# Patient Record
Sex: FEMALE | Race: WHITE | NOT HISPANIC OR LATINO | Employment: PART TIME | RURAL
[De-identification: names, ages, dates, MRNs, and addresses within clinical notes are randomized per-mention and may not be internally consistent; named-entity substitution may affect disease eponyms.]

---

## 2022-02-02 ENCOUNTER — OFFICE VISIT (OUTPATIENT)
Dept: OTOLARYNGOLOGY | Facility: CLINIC | Age: 37
End: 2022-02-02
Payer: COMMERCIAL

## 2022-02-02 VITALS — BODY MASS INDEX: 31.83 KG/M2 | WEIGHT: 210 LBS | HEIGHT: 68 IN

## 2022-02-02 DIAGNOSIS — H92.03 OTALGIA OF BOTH EARS: Primary | ICD-10-CM

## 2022-02-02 DIAGNOSIS — J31.0 OTHER RHINITIS: ICD-10-CM

## 2022-02-02 DIAGNOSIS — H60.313 DIFFUSE OTITIS EXTERNA OF BOTH EARS, UNSPECIFIED CHRONICITY: ICD-10-CM

## 2022-02-02 PROCEDURE — 3008F PR BODY MASS INDEX (BMI) DOCUMENTED: ICD-10-PCS | Mod: CPTII,,, | Performed by: OTOLARYNGOLOGY

## 2022-02-02 PROCEDURE — 99204 OFFICE O/P NEW MOD 45 MIN: CPT | Mod: S$PBB,,, | Performed by: OTOLARYNGOLOGY

## 2022-02-02 PROCEDURE — 1160F RVW MEDS BY RX/DR IN RCRD: CPT | Mod: CPTII,,, | Performed by: OTOLARYNGOLOGY

## 2022-02-02 PROCEDURE — 3008F BODY MASS INDEX DOCD: CPT | Mod: CPTII,,, | Performed by: OTOLARYNGOLOGY

## 2022-02-02 PROCEDURE — 1159F MED LIST DOCD IN RCRD: CPT | Mod: CPTII,,, | Performed by: OTOLARYNGOLOGY

## 2022-02-02 PROCEDURE — 1159F PR MEDICATION LIST DOCUMENTED IN MEDICAL RECORD: ICD-10-PCS | Mod: CPTII,,, | Performed by: OTOLARYNGOLOGY

## 2022-02-02 PROCEDURE — 99204 PR OFFICE/OUTPT VISIT, NEW, LEVL IV, 45-59 MIN: ICD-10-PCS | Mod: S$PBB,,, | Performed by: OTOLARYNGOLOGY

## 2022-02-02 PROCEDURE — 1160F PR REVIEW ALL MEDS BY PRESCRIBER/CLIN PHARMACIST DOCUMENTED: ICD-10-PCS | Mod: CPTII,,, | Performed by: OTOLARYNGOLOGY

## 2022-02-02 PROCEDURE — 99203 OFFICE O/P NEW LOW 30 MIN: CPT | Mod: PBBFAC | Performed by: OTOLARYNGOLOGY

## 2022-02-02 RX ORDER — NEOMYCIN SULFATE, POLYMYXIN B SULFATE AND HYDROCORTISONE 10; 3.5; 1 MG/ML; MG/ML; [USP'U]/ML
3 SUSPENSION/ DROPS AURICULAR (OTIC) 3 TIMES DAILY
Qty: 10 ML | Refills: 0 | Status: SHIPPED | OUTPATIENT
Start: 2022-02-02

## 2022-02-02 NOTE — PROGRESS NOTES
Subjective:       Patient ID: Theodora Murray is a 36 y.o. female.    Chief Complaint: Otalgia (Pt states both ears hurt, denies drainage. ) and Sinusitis (States she has had some sinus drainage.)    HPI  Review of Systems   HENT: Positive for ear pain, postnasal drip, rhinorrhea and sinus pain.    All other systems reviewed and are negative.      Objective:      Physical Exam  General: NAD  Head: Normocephalic, atraumatic, no facial asymmetry/normal strength,  Ears: Both auricules normal in appearance, w/o deformities tympanic membranes normal external auditory canals EAC's irritated   Nose: External nose w/o deformities normal turbinates no drainage or inflammation  Oral Cavity: Lips, gums, floor of mouth, tongue hard palate, and buccal mucosa without mass/lesion  Oropharynx: Mucosa pink and moist, soft palate, posterior pharynx and oropharyngeal wall without mass/lesion  Neck: Supple, symmetric, trachea midline, no palpable mass/lesion, no palpable cervical lymphadenopathy  Skin: Warm and dry, no concerning lesions  Respiratory: Respirations even, unlabored    Assessment:       1. Otalgia of both ears    2. Diffuse otitis externa of both ears, unspecified chronicity    3. Other rhinitis        Plan:       Csp drops   F/u 1 week      Trial zyrtec for nose

## 2022-09-22 ENCOUNTER — TELEPHONE (OUTPATIENT)
Dept: OBSTETRICS AND GYNECOLOGY | Facility: CLINIC | Age: 37
End: 2022-09-22
Payer: COMMERCIAL

## 2022-09-22 ENCOUNTER — OFFICE VISIT (OUTPATIENT)
Dept: OBSTETRICS AND GYNECOLOGY | Facility: CLINIC | Age: 37
End: 2022-09-22
Payer: COMMERCIAL

## 2022-09-22 VITALS
SYSTOLIC BLOOD PRESSURE: 146 MMHG | WEIGHT: 229 LBS | RESPIRATION RATE: 16 BRPM | HEART RATE: 89 BPM | DIASTOLIC BLOOD PRESSURE: 98 MMHG | TEMPERATURE: 98 F | BODY MASS INDEX: 34.71 KG/M2 | HEIGHT: 68 IN

## 2022-09-22 DIAGNOSIS — E55.9 VITAMIN D DEFICIENCY: ICD-10-CM

## 2022-09-22 DIAGNOSIS — N93.8 DUB (DYSFUNCTIONAL UTERINE BLEEDING): Primary | ICD-10-CM

## 2022-09-22 DIAGNOSIS — K64.9 HEMORRHOIDS, UNSPECIFIED HEMORRHOID TYPE: ICD-10-CM

## 2022-09-22 DIAGNOSIS — E87.6 HYPOKALEMIA: ICD-10-CM

## 2022-09-22 LAB
BASOPHILS # BLD AUTO: 0.06 K/UL (ref 0–0.2)
BASOPHILS NFR BLD AUTO: 0.5 % (ref 0–1)
BILIRUB UR QL STRIP: NEGATIVE
CLARITY UR: CLEAR
COLOR UR: ABNORMAL
DIFFERENTIAL METHOD BLD: ABNORMAL
EOSINOPHIL # BLD AUTO: 0.09 K/UL (ref 0–0.5)
EOSINOPHIL NFR BLD AUTO: 0.8 % (ref 1–4)
ERYTHROCYTE [DISTWIDTH] IN BLOOD BY AUTOMATED COUNT: 13.9 % (ref 11.5–14.5)
GLUCOSE UR STRIP-MCNC: NORMAL MG/DL
HCT VFR BLD AUTO: 40.7 % (ref 38–47)
HGB BLD-MCNC: 12.7 G/DL (ref 12–16)
IMM GRANULOCYTES # BLD AUTO: 0.12 K/UL (ref 0–0.04)
IMM GRANULOCYTES NFR BLD: 1.1 % (ref 0–0.4)
KETONES UR STRIP-SCNC: NEGATIVE MG/DL
LEUKOCYTE ESTERASE UR QL STRIP: NEGATIVE
LYMPHOCYTES # BLD AUTO: 1.9 K/UL (ref 1–4.8)
LYMPHOCYTES NFR BLD AUTO: 17.4 % (ref 27–41)
MCH RBC QN AUTO: 28.2 PG (ref 27–31)
MCHC RBC AUTO-ENTMCNC: 31.2 G/DL (ref 32–36)
MCV RBC AUTO: 90.4 FL (ref 80–96)
MONOCYTES # BLD AUTO: 0.74 K/UL (ref 0–0.8)
MONOCYTES NFR BLD AUTO: 6.8 % (ref 2–6)
MPC BLD CALC-MCNC: 10.5 FL (ref 9.4–12.4)
NEUTROPHILS # BLD AUTO: 8.01 K/UL (ref 1.8–7.7)
NEUTROPHILS NFR BLD AUTO: 73.4 % (ref 53–65)
NITRITE UR QL STRIP: NEGATIVE
NRBC # BLD AUTO: 0 X10E3/UL
NRBC, AUTO (.00): 0 %
PH UR STRIP: 7 PH UNITS
PLATELET # BLD AUTO: 426 K/UL (ref 150–400)
PROT UR QL STRIP: NEGATIVE
RBC # BLD AUTO: 4.5 M/UL (ref 4.2–5.4)
RBC # UR STRIP: ABNORMAL /UL
SP GR UR STRIP: 1.02
UROBILINOGEN UR STRIP-ACNC: NORMAL MG/DL
WBC # BLD AUTO: 10.92 K/UL (ref 4.5–11)

## 2022-09-22 PROCEDURE — 87660 TRICHOMONAS VAGIN DIR PROBE: CPT | Mod: ,,, | Performed by: CLINICAL MEDICAL LABORATORY

## 2022-09-22 PROCEDURE — 87510 GARDNER VAG DNA DIR PROBE: CPT | Mod: ,,, | Performed by: CLINICAL MEDICAL LABORATORY

## 2022-09-22 PROCEDURE — 80050 PR  GENERAL HEALTH PANEL: ICD-10-PCS | Mod: ,,, | Performed by: CLINICAL MEDICAL LABORATORY

## 2022-09-22 PROCEDURE — 81001 URINALYSIS, REFLEX TO URINE CULTURE: ICD-10-PCS | Mod: ,,, | Performed by: CLINICAL MEDICAL LABORATORY

## 2022-09-22 PROCEDURE — 87480 CANDIDA DNA DIR PROBE: CPT | Mod: ,,, | Performed by: CLINICAL MEDICAL LABORATORY

## 2022-09-22 PROCEDURE — 36415 COLL VENOUS BLD VENIPUNCTURE: CPT | Mod: ,,, | Performed by: OBSTETRICS & GYNECOLOGY

## 2022-09-22 PROCEDURE — 80050 GENERAL HEALTH PANEL: CPT | Mod: ,,, | Performed by: CLINICAL MEDICAL LABORATORY

## 2022-09-22 PROCEDURE — 87660 BACTERIAL VAGINOSIS: ICD-10-PCS | Mod: ,,, | Performed by: CLINICAL MEDICAL LABORATORY

## 2022-09-22 PROCEDURE — 88142 CYTOPATH C/V THIN LAYER: CPT | Mod: GCY | Performed by: OBSTETRICS & GYNECOLOGY

## 2022-09-22 PROCEDURE — 3077F PR MOST RECENT SYSTOLIC BLOOD PRESSURE >= 140 MM HG: ICD-10-PCS | Mod: CPTII,,, | Performed by: OBSTETRICS & GYNECOLOGY

## 2022-09-22 PROCEDURE — 36415 PR COLLECTION VENOUS BLOOD,VENIPUNCTURE: ICD-10-PCS | Mod: ,,, | Performed by: OBSTETRICS & GYNECOLOGY

## 2022-09-22 PROCEDURE — 99205 PR OFFICE/OUTPT VISIT, NEW, LEVL V, 60-74 MIN: ICD-10-PCS | Mod: ,,, | Performed by: OBSTETRICS & GYNECOLOGY

## 2022-09-22 PROCEDURE — 84439 THYROID PANEL: ICD-10-PCS | Mod: GZ,,, | Performed by: CLINICAL MEDICAL LABORATORY

## 2022-09-22 PROCEDURE — 84439 ASSAY OF FREE THYROXINE: CPT | Mod: GZ,,, | Performed by: CLINICAL MEDICAL LABORATORY

## 2022-09-22 PROCEDURE — 3077F SYST BP >= 140 MM HG: CPT | Mod: CPTII,,, | Performed by: OBSTETRICS & GYNECOLOGY

## 2022-09-22 PROCEDURE — 87480 BACTERIAL VAGINOSIS: ICD-10-PCS | Mod: ,,, | Performed by: CLINICAL MEDICAL LABORATORY

## 2022-09-22 PROCEDURE — 81001 URINALYSIS AUTO W/SCOPE: CPT | Mod: ,,, | Performed by: CLINICAL MEDICAL LABORATORY

## 2022-09-22 PROCEDURE — 87510 BACTERIAL VAGINOSIS: ICD-10-PCS | Mod: ,,, | Performed by: CLINICAL MEDICAL LABORATORY

## 2022-09-22 PROCEDURE — 82306 VITAMIN D 25 HYDROXY: CPT | Mod: ,,, | Performed by: CLINICAL MEDICAL LABORATORY

## 2022-09-22 PROCEDURE — 3008F BODY MASS INDEX DOCD: CPT | Mod: CPTII,,, | Performed by: OBSTETRICS & GYNECOLOGY

## 2022-09-22 PROCEDURE — 3080F DIAST BP >= 90 MM HG: CPT | Mod: CPTII,,, | Performed by: OBSTETRICS & GYNECOLOGY

## 2022-09-22 PROCEDURE — 3080F PR MOST RECENT DIASTOLIC BLOOD PRESSURE >= 90 MM HG: ICD-10-PCS | Mod: CPTII,,, | Performed by: OBSTETRICS & GYNECOLOGY

## 2022-09-22 PROCEDURE — 99205 OFFICE O/P NEW HI 60 MIN: CPT | Mod: ,,, | Performed by: OBSTETRICS & GYNECOLOGY

## 2022-09-22 PROCEDURE — 3008F PR BODY MASS INDEX (BMI) DOCUMENTED: ICD-10-PCS | Mod: CPTII,,, | Performed by: OBSTETRICS & GYNECOLOGY

## 2022-09-22 PROCEDURE — 82306 VITAMIN D: ICD-10-PCS | Mod: ,,, | Performed by: CLINICAL MEDICAL LABORATORY

## 2022-09-22 RX ORDER — BUSPIRONE HYDROCHLORIDE 10 MG/1
10 TABLET ORAL 2 TIMES DAILY
COMMUNITY
Start: 2022-09-06

## 2022-09-22 RX ORDER — ASPIRIN 325 MG
50000 TABLET, DELAYED RELEASE (ENTERIC COATED) ORAL
COMMUNITY
Start: 2022-09-09

## 2022-09-22 RX ORDER — POTASSIUM CHLORIDE 20 MEQ/1
20 TABLET, EXTENDED RELEASE ORAL 3 TIMES DAILY
COMMUNITY
Start: 2022-09-09

## 2022-09-22 RX ORDER — ATENOLOL 100 MG/1
100 TABLET ORAL DAILY
COMMUNITY
Start: 2022-09-08

## 2022-09-22 RX ORDER — CETIRIZINE HYDROCHLORIDE 10 MG/1
10 TABLET ORAL NIGHTLY
COMMUNITY
Start: 2022-09-08

## 2022-09-22 RX ORDER — OMEPRAZOLE 40 MG/1
40 CAPSULE, DELAYED RELEASE ORAL DAILY
COMMUNITY
Start: 2022-09-08

## 2022-09-22 RX ORDER — DULOXETIN HYDROCHLORIDE 30 MG/1
30 CAPSULE, DELAYED RELEASE ORAL DAILY
COMMUNITY
Start: 2022-07-25

## 2022-09-22 RX ORDER — METRONIDAZOLE 500 MG/1
500 TABLET ORAL 2 TIMES DAILY
Status: ON HOLD | COMMUNITY
Start: 2022-09-08 | End: 2022-11-16

## 2022-09-22 RX ORDER — CHLORTHALIDONE 25 MG/1
25 TABLET ORAL DAILY
COMMUNITY
Start: 2022-09-08

## 2022-09-23 LAB
25(OH)D3 SERPL-MCNC: 35.6 NG/ML
ALBUMIN SERPL BCP-MCNC: 4 G/DL (ref 3.5–5)
ALBUMIN/GLOB SERPL: 1.1 {RATIO}
ALP SERPL-CCNC: 88 U/L (ref 37–98)
ALT SERPL W P-5'-P-CCNC: 34 U/L (ref 13–56)
ANION GAP SERPL CALCULATED.3IONS-SCNC: 12 MMOL/L (ref 7–16)
AST SERPL W P-5'-P-CCNC: 23 U/L (ref 15–37)
BACTERIA #/AREA URNS HPF: ABNORMAL /HPF
BILIRUB SERPL-MCNC: 0.3 MG/DL (ref ?–1.2)
BUN SERPL-MCNC: 15 MG/DL (ref 7–18)
BUN/CREAT SERPL: 22 (ref 6–20)
CALCIUM SERPL-MCNC: 9.3 MG/DL (ref 8.5–10.1)
CANDIDA SPECIES: NEGATIVE
CHLORIDE SERPL-SCNC: 99 MMOL/L (ref 98–107)
CO2 SERPL-SCNC: 29 MMOL/L (ref 21–32)
CREAT SERPL-MCNC: 0.68 MG/DL (ref 0.55–1.02)
EGFR (NO RACE VARIABLE) (RUSH/TITUS): 116 ML/MIN/1.73M²
GARDNERELLA: NEGATIVE
GLOBULIN SER-MCNC: 3.6 G/DL (ref 2–4)
GLUCOSE SERPL-MCNC: 77 MG/DL (ref 74–106)
MUCOUS, UA: ABNORMAL /LPF
POTASSIUM SERPL-SCNC: 3 MMOL/L (ref 3.5–5.1)
PROT SERPL-MCNC: 7.6 G/DL (ref 6.4–8.2)
RBC #/AREA URNS HPF: 13 /HPF
SODIUM SERPL-SCNC: 137 MMOL/L (ref 136–145)
SQUAMOUS #/AREA URNS LPF: ABNORMAL /HPF
T4 FREE SERPL-MCNC: 1.08 NG/DL (ref 0.76–1.46)
TRICHOMONAS: NEGATIVE
TSH SERPL DL<=0.005 MIU/L-ACNC: 0.93 UIU/ML (ref 0.36–3.74)
WBC #/AREA URNS HPF: <1 /HPF

## 2022-09-23 NOTE — PROGRESS NOTES
Epic system down. See  for office visit note.    Per Dr. Chery schedule pelvic ultrasound and schedule for D&C hysteroscopy

## 2022-09-26 LAB
GH SERPL-MCNC: NORMAL NG/ML
INSULIN SERPL-ACNC: NORMAL U[IU]/ML
LAB AP CLINICAL INFORMATION: NORMAL
LAB AP GYN INTERPRETATION: NEGATIVE
LAB AP PAP DISCLAIMER COMMENTS: NORMAL
RENIN PLAS-CCNC: NORMAL NG/ML/H

## 2022-09-27 ENCOUNTER — TELEPHONE (OUTPATIENT)
Dept: OBSTETRICS AND GYNECOLOGY | Facility: CLINIC | Age: 37
End: 2022-09-27
Payer: COMMERCIAL

## 2022-09-28 ENCOUNTER — TELEPHONE (OUTPATIENT)
Dept: OBSTETRICS AND GYNECOLOGY | Facility: CLINIC | Age: 37
End: 2022-09-28
Payer: COMMERCIAL

## 2022-10-05 ENCOUNTER — HOSPITAL ENCOUNTER (OUTPATIENT)
Dept: RADIOLOGY | Facility: HOSPITAL | Age: 37
Discharge: HOME | End: 2022-10-05
Attending: OBSTETRICS & GYNECOLOGY
Payer: COMMERCIAL

## 2022-10-05 DIAGNOSIS — N93.8 DUB (DYSFUNCTIONAL UTERINE BLEEDING): ICD-10-CM

## 2022-10-05 PROCEDURE — 76830 US PELVIS COMP WITH TRANSVAG NON-OB (XPD): ICD-10-PCS | Mod: 26,,, | Performed by: STUDENT IN AN ORGANIZED HEALTH CARE EDUCATION/TRAINING PROGRAM

## 2022-10-05 PROCEDURE — 76830 TRANSVAGINAL US NON-OB: CPT | Mod: 26,,, | Performed by: STUDENT IN AN ORGANIZED HEALTH CARE EDUCATION/TRAINING PROGRAM

## 2022-10-05 PROCEDURE — 76856 US PELVIS COMP WITH TRANSVAG NON-OB (XPD): ICD-10-PCS | Mod: 26,,, | Performed by: STUDENT IN AN ORGANIZED HEALTH CARE EDUCATION/TRAINING PROGRAM

## 2022-10-05 PROCEDURE — 76830 TRANSVAGINAL US NON-OB: CPT | Mod: TC

## 2022-10-05 PROCEDURE — 76856 US EXAM PELVIC COMPLETE: CPT | Mod: 26,,, | Performed by: STUDENT IN AN ORGANIZED HEALTH CARE EDUCATION/TRAINING PROGRAM

## 2022-10-13 ENCOUNTER — OFFICE VISIT (OUTPATIENT)
Dept: OBSTETRICS AND GYNECOLOGY | Facility: CLINIC | Age: 37
End: 2022-10-13
Payer: COMMERCIAL

## 2022-10-13 VITALS
BODY MASS INDEX: 33.14 KG/M2 | HEART RATE: 83 BPM | WEIGHT: 218.63 LBS | DIASTOLIC BLOOD PRESSURE: 61 MMHG | HEIGHT: 68 IN | RESPIRATION RATE: 16 BRPM | SYSTOLIC BLOOD PRESSURE: 109 MMHG | TEMPERATURE: 97 F

## 2022-10-13 DIAGNOSIS — N32.81 OAB (OVERACTIVE BLADDER): ICD-10-CM

## 2022-10-13 DIAGNOSIS — N94.6 DYSMENORRHEA: ICD-10-CM

## 2022-10-13 DIAGNOSIS — N94.19 DYSPAREUNIA DUE TO MEDICAL CONDITION IN FEMALE: ICD-10-CM

## 2022-10-13 DIAGNOSIS — N93.9 ABNORMAL UTERINE BLEEDING (AUB): Primary | ICD-10-CM

## 2022-10-13 DIAGNOSIS — R35.1 NOCTURIA MORE THAN TWICE PER NIGHT: ICD-10-CM

## 2022-10-13 LAB
BASOPHILS # BLD AUTO: 0.05 K/UL (ref 0–0.2)
BASOPHILS NFR BLD AUTO: 0.5 % (ref 0–1)
BILIRUB UR QL STRIP: NEGATIVE
CANCER AG125 SERPL-ACNC: 12 U/ML (ref 0–35)
CLARITY UR: ABNORMAL
COLOR UR: YELLOW
DIFFERENTIAL METHOD BLD: ABNORMAL
EOSINOPHIL # BLD AUTO: 0.07 K/UL (ref 0–0.5)
EOSINOPHIL NFR BLD AUTO: 0.7 % (ref 1–4)
ERYTHROCYTE [DISTWIDTH] IN BLOOD BY AUTOMATED COUNT: 14.2 % (ref 11.5–14.5)
ERYTHROCYTE [SEDIMENTATION RATE] IN BLOOD BY WESTERGREN METHOD: 7 MM/HR (ref 0–20)
GLUCOSE UR STRIP-MCNC: NORMAL MG/DL
HCT VFR BLD AUTO: 44.7 % (ref 38–47)
HGB BLD-MCNC: 14.3 G/DL (ref 12–16)
IMM GRANULOCYTES # BLD AUTO: 0.07 K/UL (ref 0–0.04)
IMM GRANULOCYTES NFR BLD: 0.7 % (ref 0–0.4)
KETONES UR STRIP-SCNC: NEGATIVE MG/DL
LEUKOCYTE ESTERASE UR QL STRIP: NEGATIVE
LYMPHOCYTES # BLD AUTO: 2 K/UL (ref 1–4.8)
LYMPHOCYTES NFR BLD AUTO: 18.6 % (ref 27–41)
MCH RBC QN AUTO: 28.3 PG (ref 27–31)
MCHC RBC AUTO-ENTMCNC: 32 G/DL (ref 32–36)
MCV RBC AUTO: 88.5 FL (ref 80–96)
MONOCYTES # BLD AUTO: 0.84 K/UL (ref 0–0.8)
MONOCYTES NFR BLD AUTO: 7.8 % (ref 2–6)
MPC BLD CALC-MCNC: 10.3 FL (ref 9.4–12.4)
NEUTROPHILS # BLD AUTO: 7.7 K/UL (ref 1.8–7.7)
NEUTROPHILS NFR BLD AUTO: 71.7 % (ref 53–65)
NITRITE UR QL STRIP: NEGATIVE
NRBC # BLD AUTO: 0 X10E3/UL
NRBC, AUTO (.00): 0 %
PH UR STRIP: 7 PH UNITS
PLATELET # BLD AUTO: 479 K/UL (ref 150–400)
PROT UR QL STRIP: 20
RBC # BLD AUTO: 5.05 M/UL (ref 4.2–5.4)
RBC # UR STRIP: NEGATIVE /UL
SP GR UR STRIP: 1.02
UROBILINOGEN UR STRIP-ACNC: NORMAL MG/DL
WBC # BLD AUTO: 10.73 K/UL (ref 4.5–11)

## 2022-10-13 PROCEDURE — 85651 SEDIMENTATION RATE, AUTOMATED: ICD-10-PCS | Mod: ,,, | Performed by: CLINICAL MEDICAL LABORATORY

## 2022-10-13 PROCEDURE — 3078F DIAST BP <80 MM HG: CPT | Mod: CPTII,,, | Performed by: OBSTETRICS & GYNECOLOGY

## 2022-10-13 PROCEDURE — 1159F MED LIST DOCD IN RCRD: CPT | Mod: CPTII,,, | Performed by: OBSTETRICS & GYNECOLOGY

## 2022-10-13 PROCEDURE — 81003 URINALYSIS, REFLEX TO URINE CULTURE: ICD-10-PCS | Mod: QW,,, | Performed by: CLINICAL MEDICAL LABORATORY

## 2022-10-13 PROCEDURE — 36415 COLL VENOUS BLD VENIPUNCTURE: CPT | Mod: ,,, | Performed by: OBSTETRICS & GYNECOLOGY

## 2022-10-13 PROCEDURE — 3078F PR MOST RECENT DIASTOLIC BLOOD PRESSURE < 80 MM HG: ICD-10-PCS | Mod: CPTII,,, | Performed by: OBSTETRICS & GYNECOLOGY

## 2022-10-13 PROCEDURE — 81003 URINALYSIS AUTO W/O SCOPE: CPT | Mod: QW,,, | Performed by: CLINICAL MEDICAL LABORATORY

## 2022-10-13 PROCEDURE — 36415 PR COLLECTION VENOUS BLOOD,VENIPUNCTURE: ICD-10-PCS | Mod: ,,, | Performed by: OBSTETRICS & GYNECOLOGY

## 2022-10-13 PROCEDURE — 1159F PR MEDICATION LIST DOCUMENTED IN MEDICAL RECORD: ICD-10-PCS | Mod: CPTII,,, | Performed by: OBSTETRICS & GYNECOLOGY

## 2022-10-13 PROCEDURE — 85025 COMPLETE CBC W/AUTO DIFF WBC: CPT | Mod: ,,, | Performed by: CLINICAL MEDICAL LABORATORY

## 2022-10-13 PROCEDURE — 85651 RBC SED RATE NONAUTOMATED: CPT | Mod: ,,, | Performed by: CLINICAL MEDICAL LABORATORY

## 2022-10-13 PROCEDURE — 86304 IMMUNOASSAY TUMOR CA 125: CPT | Mod: ,,, | Performed by: CLINICAL MEDICAL LABORATORY

## 2022-10-13 PROCEDURE — 99214 PR OFFICE/OUTPT VISIT, EST, LEVL IV, 30-39 MIN: ICD-10-PCS | Mod: ,,, | Performed by: OBSTETRICS & GYNECOLOGY

## 2022-10-13 PROCEDURE — 86304 CANCER ANTIGEN 125: ICD-10-PCS | Mod: ,,, | Performed by: CLINICAL MEDICAL LABORATORY

## 2022-10-13 PROCEDURE — 99214 OFFICE O/P EST MOD 30 MIN: CPT | Mod: ,,, | Performed by: OBSTETRICS & GYNECOLOGY

## 2022-10-13 PROCEDURE — 85025 CBC WITH DIFFERENTIAL: ICD-10-PCS | Mod: ,,, | Performed by: CLINICAL MEDICAL LABORATORY

## 2022-10-13 PROCEDURE — 3074F SYST BP LT 130 MM HG: CPT | Mod: CPTII,,, | Performed by: OBSTETRICS & GYNECOLOGY

## 2022-10-13 PROCEDURE — 3074F PR MOST RECENT SYSTOLIC BLOOD PRESSURE < 130 MM HG: ICD-10-PCS | Mod: CPTII,,, | Performed by: OBSTETRICS & GYNECOLOGY

## 2022-10-13 NOTE — PROGRESS NOTES
Theodora Arndt female  for   Chief Complaint   Patient presents with    Follow-up     Labs and ultrasound results          PHI:  Follow-up on Theodora arndt is 37 years old with  0, para 0 condition.  Patient relates she has had for almost now 4 months abnormal uterine bleeding.  Most recent was 4 days ago.  Associated is crampy pelvic pain.  Patient's last Depo-Provera for contraception was approximately 6 months ago.  All symptoms of her abnormal uterine bleeding are new.    Her pelvic ultrasound:  Uterus measures 5.5 x 3.8 x 2.5 cm.  Endometrial stripe is normal in size measuring 0.4 cm.  Right ovary is poorly visualized.     The left ovary measures 3 x 1.4 x 2.0 cm.  Normal flow to the left ovary.     [place] hypoechogenic structure with possible Doppler flow arising from the cervix measuring 1.5 x 0.8 cm. This could represent complex nabothian cyst or possibly clotted blood with surrounding fluid. This is a nonspecific finding at this current time.  Direct visualization is recommended. Furthermore MRI of the pelvis without and with intravenous contrast should be considered.     Impression:     [place] hypoechogenic structure with possible Doppler flow arising from the cervix measuring 1.5 x 0.8 cm. This could represent complex nabothian cyst or possibly clotted blood with surrounding fluid. This is a nonspecific finding at this current time.  Direct visualization is recommended. Furthermore MRI of the pelvis without and with intravenous contrast should be considered.     Right ovary is poorly visualized.  Exam is otherwise normal.        Electronically signed by: Sagar Parrish  Date:                                            10/05/2022  Time:                                           16:04           Exam Ended: 10/05/22 14:30 Last Resulted: 10/05/22 16:04         Patient also relates addition worsening of dysmenorrhea or crampy pain with abnormal bleeding she has worsening symptoms of generalized pelvic  "pain.  Patient currently is not sexually active.  Patient when she was sexually active did complain of dyspareunia.  She does complain urgency and frequency since the onset of her symptoms 3-4 months ago.  Patient relates the general pelvic pain is been present for several years.    She relates in addition urgency and frequency she complains of nocturia-several times per night-greater than 2 times per night.  These are chronic problems.  Her last urinalysis did reveal some microscopic hematuria.    Past Medical History:   Diagnosis Date    Chronic GERD     Depression     Generalized anxiety disorder     Hypertension     Hypokalemia     Unspecified hemorrhoids       History reviewed. No pertinent surgical history.   Review of patient's allergies indicates:  No Known Allergies     ROS:Pertinent items are noted in HPI.    Physical exam:    /61   Pulse 83   Temp 96.7 °F (35.9 °C)   Resp 16   Ht 5' 8" (1.727 m)   Wt 99.2 kg (218 lb 9.6 oz)   LMP 10/06/2022   BMI 33.24 kg/m²      General Appearance: healthy, alert, no distress, smiling, BMI is 33.24    Chest:           Lungs: clear to auscultation bilaterally and normal percussion bilaterally           Heart: regular rate and rhythm, S1, S2 normal, no murmur, click, rub or gallop, normal apical impulse, and regular rate and rhythm    Abdomen:not performed    Pelvic: not performed     Extremity: normal, extremities warm, no clubbing, no cyanosis, no edema, non-tender    Skin: normal exam        Assessment:   Problem List Items Addressed This Visit    None  Visit Diagnoses       Abnormal uterine bleeding (AUB)    -  Primary    Dysmenorrhea        Dyspareunia due to medical condition in female        Nocturia more than twice per night        OAB (overactive bladder)                 Plan:  Recommendation is schedules patient for D&C/hysteroscopy and laparoscopy; patient is has abnormal uterine bleeding (with remarkable pelvic ultrasound - cervical pathology?)and " schedule for a laparoscopy with the  indication of chronic pelvic pain (suspect endometriosis).            Repeat urine and urine culture today; recommend sed rate and .                 Addenum:  BV is negative.

## 2022-10-13 NOTE — PATIENT INSTRUCTIONS
Dr. Claude Chery thanks you for this office visit at [facility].    Diagnosis for this visit:   Problem List Items Addressed This Visit    None  Visit Diagnoses       Abnormal uterine bleeding (AUB)    -  Primary    Dysmenorrhea                 The Plan:  In view of chronic pelvic pain, some past history dyspareunia, continued abnormal uterine bleeding with a negative pelvic ultrasound as regards thickening of the endometrium, due to nulliparous condition Dr. Claude Chery will recommend a D&C with hysteroscopy under anesthesia and a laparoscopy-laparoscopy evaluate for chronic pain condition.      Please practice best food and exercise habits for your age.    Dr. Claude Chery recommends avoidance of smoking and illicit medications or habits.    Please call  or schedule for any change in your health.     Please keep the next scheduled appointment or call for any need to change the appointment.     Dr. Claude Chery recommends yearly exams for good health maintenance.      Thank you    Dr. Claude Chery  10/13/2022 3:48 PM

## 2022-11-15 ENCOUNTER — OFFICE VISIT (OUTPATIENT)
Dept: OBSTETRICS AND GYNECOLOGY | Facility: CLINIC | Age: 37
End: 2022-11-15
Payer: COMMERCIAL

## 2022-11-15 VITALS
HEART RATE: 96 BPM | TEMPERATURE: 97 F | DIASTOLIC BLOOD PRESSURE: 91 MMHG | RESPIRATION RATE: 16 BRPM | SYSTOLIC BLOOD PRESSURE: 122 MMHG | BODY MASS INDEX: 33.77 KG/M2 | WEIGHT: 222.81 LBS | HEIGHT: 68 IN

## 2022-11-15 DIAGNOSIS — N88.8 CERVICITIS WITH NABOTHIAN CYST: ICD-10-CM

## 2022-11-15 DIAGNOSIS — R10.2 CHRONIC PELVIC PAIN IN FEMALE: ICD-10-CM

## 2022-11-15 DIAGNOSIS — N94.6 DYSMENORRHEA: ICD-10-CM

## 2022-11-15 DIAGNOSIS — G89.29 CHRONIC PELVIC PAIN IN FEMALE: ICD-10-CM

## 2022-11-15 DIAGNOSIS — N72 CERVICITIS WITH NABOTHIAN CYST: ICD-10-CM

## 2022-11-15 DIAGNOSIS — N92.1 MENOMETRORRHAGIA: Primary | ICD-10-CM

## 2022-11-15 DIAGNOSIS — I10 HYPERTENSION, UNSPECIFIED TYPE: ICD-10-CM

## 2022-11-15 LAB
ALBUMIN SERPL BCP-MCNC: 3.9 G/DL (ref 3.5–5)
ALBUMIN/GLOB SERPL: 1.1 {RATIO}
ALP SERPL-CCNC: 79 U/L (ref 37–98)
ALT SERPL W P-5'-P-CCNC: 40 U/L (ref 13–56)
ANION GAP SERPL CALCULATED.3IONS-SCNC: 11 MMOL/L (ref 7–16)
AST SERPL W P-5'-P-CCNC: 17 U/L (ref 15–37)
BASOPHILS # BLD AUTO: 0.06 K/UL (ref 0–0.2)
BASOPHILS NFR BLD AUTO: 0.6 % (ref 0–1)
BILIRUB SERPL-MCNC: 0.3 MG/DL (ref ?–1.2)
BILIRUB UR QL STRIP: NEGATIVE
BUN SERPL-MCNC: 12 MG/DL (ref 7–18)
BUN/CREAT SERPL: 19 (ref 6–20)
CALCIUM SERPL-MCNC: 9.6 MG/DL (ref 8.5–10.1)
CHLORIDE SERPL-SCNC: 101 MMOL/L (ref 98–107)
CLARITY UR: CLEAR
CO2 SERPL-SCNC: 28 MMOL/L (ref 21–32)
COLOR UR: NORMAL
CREAT SERPL-MCNC: 0.64 MG/DL (ref 0.55–1.02)
DIFFERENTIAL METHOD BLD: ABNORMAL
EGFR (NO RACE VARIABLE) (RUSH/TITUS): 118 ML/MIN/1.73M²
EOSINOPHIL # BLD AUTO: 0.11 K/UL (ref 0–0.5)
EOSINOPHIL NFR BLD AUTO: 1 % (ref 1–4)
ERYTHROCYTE [DISTWIDTH] IN BLOOD BY AUTOMATED COUNT: 14.8 % (ref 11.5–14.5)
GLOBULIN SER-MCNC: 3.6 G/DL (ref 2–4)
GLUCOSE SERPL-MCNC: 91 MG/DL (ref 74–106)
GLUCOSE UR STRIP-MCNC: NORMAL MG/DL
HCG SERPL-ACNC: <1 MIU/ML
HCT VFR BLD AUTO: 43.8 % (ref 38–47)
HGB BLD-MCNC: 13.6 G/DL (ref 12–16)
IMM GRANULOCYTES # BLD AUTO: 0.07 K/UL (ref 0–0.04)
IMM GRANULOCYTES NFR BLD: 0.6 % (ref 0–0.4)
KETONES UR STRIP-SCNC: NEGATIVE MG/DL
LEUKOCYTE ESTERASE UR QL STRIP: NEGATIVE
LYMPHOCYTES # BLD AUTO: 1.67 K/UL (ref 1–4.8)
LYMPHOCYTES NFR BLD AUTO: 15.5 % (ref 27–41)
MCH RBC QN AUTO: 28.1 PG (ref 27–31)
MCHC RBC AUTO-ENTMCNC: 31.1 G/DL (ref 32–36)
MCV RBC AUTO: 90.5 FL (ref 80–96)
MONOCYTES # BLD AUTO: 0.77 K/UL (ref 0–0.8)
MONOCYTES NFR BLD AUTO: 7.1 % (ref 2–6)
MPC BLD CALC-MCNC: 10 FL (ref 9.4–12.4)
NEUTROPHILS # BLD AUTO: 8.1 K/UL (ref 1.8–7.7)
NEUTROPHILS NFR BLD AUTO: 75.2 % (ref 53–65)
NITRITE UR QL STRIP: NEGATIVE
NRBC # BLD AUTO: 0 X10E3/UL
NRBC, AUTO (.00): 0 %
PH UR STRIP: 7 PH UNITS
PLATELET # BLD AUTO: 413 K/UL (ref 150–400)
POTASSIUM SERPL-SCNC: 3.2 MMOL/L (ref 3.5–5.1)
PROT SERPL-MCNC: 7.5 G/DL (ref 6.4–8.2)
PROT UR QL STRIP: NEGATIVE
RBC # BLD AUTO: 4.84 M/UL (ref 4.2–5.4)
RBC # UR STRIP: NEGATIVE /UL
SODIUM SERPL-SCNC: 137 MMOL/L (ref 136–145)
SP GR UR STRIP: 1.02
UROBILINOGEN UR STRIP-ACNC: NORMAL MG/DL
WBC # BLD AUTO: 10.78 K/UL (ref 4.5–11)

## 2022-11-15 PROCEDURE — 84702 CHORIONIC GONADOTROPIN TEST: CPT | Mod: ,,, | Performed by: CLINICAL MEDICAL LABORATORY

## 2022-11-15 PROCEDURE — 80053 COMPREHENSIVE METABOLIC PANEL: ICD-10-PCS | Mod: ,,, | Performed by: CLINICAL MEDICAL LABORATORY

## 2022-11-15 PROCEDURE — 1159F PR MEDICATION LIST DOCUMENTED IN MEDICAL RECORD: ICD-10-PCS | Mod: CPTII,,, | Performed by: OBSTETRICS & GYNECOLOGY

## 2022-11-15 PROCEDURE — 99215 PR OFFICE/OUTPT VISIT, EST, LEVL V, 40-54 MIN: ICD-10-PCS | Mod: ,,, | Performed by: OBSTETRICS & GYNECOLOGY

## 2022-11-15 PROCEDURE — 85025 COMPLETE CBC W/AUTO DIFF WBC: CPT | Mod: ,,, | Performed by: CLINICAL MEDICAL LABORATORY

## 2022-11-15 PROCEDURE — 85025 CBC WITH DIFFERENTIAL: ICD-10-PCS | Mod: ,,, | Performed by: CLINICAL MEDICAL LABORATORY

## 2022-11-15 PROCEDURE — 84702 HCG, TOTAL, QUANTITATIVE: ICD-10-PCS | Mod: ,,, | Performed by: CLINICAL MEDICAL LABORATORY

## 2022-11-15 PROCEDURE — 99215 OFFICE O/P EST HI 40 MIN: CPT | Mod: ,,, | Performed by: OBSTETRICS & GYNECOLOGY

## 2022-11-15 PROCEDURE — 3008F PR BODY MASS INDEX (BMI) DOCUMENTED: ICD-10-PCS | Mod: CPTII,,, | Performed by: OBSTETRICS & GYNECOLOGY

## 2022-11-15 PROCEDURE — 36415 COLL VENOUS BLD VENIPUNCTURE: CPT | Mod: ,,, | Performed by: OBSTETRICS & GYNECOLOGY

## 2022-11-15 PROCEDURE — 1160F RVW MEDS BY RX/DR IN RCRD: CPT | Mod: CPTII,,, | Performed by: OBSTETRICS & GYNECOLOGY

## 2022-11-15 PROCEDURE — 3008F BODY MASS INDEX DOCD: CPT | Mod: CPTII,,, | Performed by: OBSTETRICS & GYNECOLOGY

## 2022-11-15 PROCEDURE — 81003 URINALYSIS AUTO W/O SCOPE: CPT | Mod: QW,,, | Performed by: CLINICAL MEDICAL LABORATORY

## 2022-11-15 PROCEDURE — 36415 PR COLLECTION VENOUS BLOOD,VENIPUNCTURE: ICD-10-PCS | Mod: ,,, | Performed by: OBSTETRICS & GYNECOLOGY

## 2022-11-15 PROCEDURE — 1160F PR REVIEW ALL MEDS BY PRESCRIBER/CLIN PHARMACIST DOCUMENTED: ICD-10-PCS | Mod: CPTII,,, | Performed by: OBSTETRICS & GYNECOLOGY

## 2022-11-15 PROCEDURE — 80053 COMPREHEN METABOLIC PANEL: CPT | Mod: ,,, | Performed by: CLINICAL MEDICAL LABORATORY

## 2022-11-15 PROCEDURE — 3080F DIAST BP >= 90 MM HG: CPT | Mod: CPTII,,, | Performed by: OBSTETRICS & GYNECOLOGY

## 2022-11-15 PROCEDURE — 3074F PR MOST RECENT SYSTOLIC BLOOD PRESSURE < 130 MM HG: ICD-10-PCS | Mod: CPTII,,, | Performed by: OBSTETRICS & GYNECOLOGY

## 2022-11-15 PROCEDURE — 3080F PR MOST RECENT DIASTOLIC BLOOD PRESSURE >= 90 MM HG: ICD-10-PCS | Mod: CPTII,,, | Performed by: OBSTETRICS & GYNECOLOGY

## 2022-11-15 PROCEDURE — 1159F MED LIST DOCD IN RCRD: CPT | Mod: CPTII,,, | Performed by: OBSTETRICS & GYNECOLOGY

## 2022-11-15 PROCEDURE — 3074F SYST BP LT 130 MM HG: CPT | Mod: CPTII,,, | Performed by: OBSTETRICS & GYNECOLOGY

## 2022-11-15 PROCEDURE — 81003 URINALYSIS: ICD-10-PCS | Mod: QW,,, | Performed by: CLINICAL MEDICAL LABORATORY

## 2022-11-15 RX ORDER — ONDANSETRON 2 MG/ML
4 INJECTION INTRAMUSCULAR; INTRAVENOUS EVERY 12 HOURS PRN
Status: CANCELLED | OUTPATIENT
Start: 2022-11-15

## 2022-11-15 RX ORDER — FLUCONAZOLE 100 MG/1
100 TABLET ORAL DAILY
Status: ON HOLD | COMMUNITY
Start: 2022-10-31 | End: 2022-11-16

## 2022-11-15 RX ORDER — RISPERIDONE 0.5 MG/1
0.5 TABLET ORAL 2 TIMES DAILY
COMMUNITY
Start: 2022-11-10

## 2022-11-15 RX ORDER — MUPIROCIN 20 MG/G
OINTMENT TOPICAL
Status: CANCELLED | OUTPATIENT
Start: 2022-11-15

## 2022-11-15 RX ORDER — SODIUM CHLORIDE, SODIUM LACTATE, POTASSIUM CHLORIDE, CALCIUM CHLORIDE 600; 310; 30; 20 MG/100ML; MG/100ML; MG/100ML; MG/100ML
INJECTION, SOLUTION INTRAVENOUS CONTINUOUS
Status: CANCELLED | OUTPATIENT
Start: 2022-11-15 | End: 2022-11-15

## 2022-11-15 RX ORDER — LIDOCAINE HYDROCHLORIDE 10 MG/ML
1 INJECTION, SOLUTION EPIDURAL; INFILTRATION; INTRACAUDAL; PERINEURAL ONCE
Status: CANCELLED | OUTPATIENT
Start: 2022-11-15 | End: 2022-11-15

## 2022-11-15 NOTE — H&P
[facility] - [facility]  Obstetrics & Gynecology  History & Physical    Patient Name: Theodora Murray  MRN: [id number]  Admission Date:  11/16/2022 at [facility] outpatient department  Gyn Surgeon: Claude Chery     Subjective:     Chief Complaint/Reason for Admission:  Abnormal uterine bleeding and dysmenorrhea/chronic pelvic pain- suspect endometriosis    History of Present Illness:  Theodora Murray 37 y.o. female is scheduled for exam under anesthesia with D and C/hysteroscopy as well as a laparoscopy. Theodora presents  for her preoperative exam at [facility] clinic.  Her gynecologic problem:  Dysmenorrhea and chronic pelvic pain as well as history of recurrent abnormal uterine bleeding.  Last menstrual period was 11/11/2022.  Patient considers the flow excessive as well as very painful.  Most recent ultrasound was performed on 10/05/2022.  Patient has a hypoechogenic area measuring 1.5 x 8 cm in the endocervix.     Recent sonography report:    Uterus measures 5.5 x 3.8 x 2.5 cm.  Endometrial stripe is normal in size measuring 0.4 cm.  Right ovary is poorly visualized.     The left ovary measures 3 x 1.4 x 2.0 cm.  Normal flow to the left ovary.     [place] hypoechogenic structure with possible Doppler flow arising from the cervix measuring 1.5 x 0.8 cm. This could represent complex nabothian cyst or possibly clotted blood with surrounding fluid. This is a nonspecific finding at this current time.  Direct visualization is recommended. Furthermore MRI of the pelvis without and with intravenous contrast should be considered.     Impression:     [place] hypoechogenic structure with possible Doppler flow arising from the cervix measuring 1.5 x 0.8 cm. This could represent complex nabothian cyst or possibly clotted blood with surrounding fluid. This is a nonspecific finding at this current time.  Direct visualization is recommended. Furthermore MRI of the pelvis without and with intravenous  "contrast should be considered.     Right ovary is poorly visualized.  Exam is otherwise normal.        Electronically signed by: Sagarnorberto Parrish  Date:                                            10/05/2022  Time:                                           16:04    Her gyn condition has been present for at least 2-3 year.    Patient does admit to dyspareunia in the past.  Patient is a .  She currently is not sexually active.    Patient admits to COVID vaccination.  She believes she possibly also had COVID-19 infection.    Past History:  Past Medical History:   Diagnosis Date    Chronic GERD     Depression     Generalized anxiety disorder     Hypertension     Hypokalemia     Unspecified hemorrhoids       History reviewed. No pertinent surgical history.   Family History   Problem Relation Age of Onset    Hypertension Mother     Osteoporosis Mother         Allergy;  Review of patient's allergies indicates:  No Known Allergies    Review of Systems - refer to gyn    Physical Exam:    BP (!) 122/91 (BP Location: Left arm, Patient Position: Sitting)   Pulse 96   Temp 97.2 °F (36.2 °C)   Resp 16   Ht 5' 8" (1.727 m)   Wt 101.1 kg (222 lb 12.8 oz)   LMP 11/11/2022   BMI 33.88 kg/m²     General Appearance: healthy, alert, no distress, smiling, BMI is 33.88    Psych: normal mood, behavior, speech, dress, motor activity, and thought processes    Neuro: alert, oriented x3  speech normal in context and clarity  memory intact grossly  reflexes: full and symmetric    HEENT: Head: Normocephalic, no lesions, without obvious abnormality.  Eye: Normal external eye, conjunctiva, lids cornea, DONI.  Ears: Normal TM's bilaterally. Normal auditory canals and external ears. Non-tender.  Pharynx: Dental Hygiene adequate. Normal buccal mucosa. Normal pharynx.  Neck / Thyroid: Supple, no masses, nodes, nodules or enlargement.    Chest:            Breast:  breasts appear normal, no suspicious masses, no skin or nipple changes or " axillary nodes.             Lungs: clear to auscultation bilaterally and normal percussion bilaterally            Heart:   regular rate and rhythm, S1, S2 normal, no murmur, click, rub or gallop, normal apical impulse, and regular rate and rhythm    Abdomen:soft, non-tender, without masses or organomegaly, normal bowel sounds, without guarding, and without rebound  Pelvic:            Vulva: normal appearing vulva with no masses, tenderness or lesions            Vagina:  Normal examination except for slight bloody discharge from the cervix           Cervix: normal appearing cervix without discharge or lesions, nulliparous os           Uterus:: uterus is normal size, shape, consistency and nontender, anteverted, mobile           [place](e) normal adnexa in size, nontender and no masses           Rectal: normal rectal, no masses.     Extremity:normal, extremities warm, no clubbing, no cyanosis, no edema, non-tender    Skin:Skin color, texture, turgor normal. No rashes or lesions      .  Assessment:     Dysmenorrhea/menorrhagia/chronic pelvic pain - suspect endometriosis  Problem List Items Addressed This Visit          Renal/    Dysmenorrhea    Menometrorrhagia - Primary       GI    Chronic pelvic pain in female     Other Visit Diagnoses       Cervicitis with nabothian cyst        Abnormal imaging of the endocervix    Hypertension, unspecified type        Questionable            Plan:  Exam under anesthesia followed by a D&C/laparoscopy followed by exploratory laparoscopy    Consent for this Procedure(s):   Plan: D&C/Hysteroscopy/laparoscopy    Discussion of Surgical Consent for the procedure of D&C/Hysteroscopy/laparoscopy:    This is a minimally invasive outpatient procedure.    Anesthesia: General Anesthesia  After the onset of general anesthesia, the position will be for a pelvic exam.position with surgical skin prep. After a pelvic exam, then a speculum (weighted) will be inserted. The cervix will be visualized  and grasped with a tenaculum to hold the cervix for the procedure. Carefully using metal dialator, the cervical opening will be enlarged. Next a  scope will be inserted to view the uterine cavity. Following this phase of surgery, a scraping of the lining of the uterus (the endometrium) will be performed. This process is called a curettage. After completion, the grasper on the cervix will be removed and a rectal exam will check for any additional findings. The weighted speculum will be removed. After completion of surgery, the position will be flat on your back and then upon awakening, there will be transfer to the PACU (post operative recovery) and then the outpatient room (that you started from on upon admission). T    Risks:  The minimal risks include rare chance for a vaginal,or uterine infection, deeper tissue infection or abscess ( very rare) or small hematoma or bleeding due to puncture of the uterus and tissue around the uterus - very rare. The puncture of the uterus is occurring on average (national) once every 300 - 600 cases. Very rarely such a complication could result in an emergency laparoscopy or laparotomy correct the complication.A bowel or bladder injury could occur as a result of the puncture or uterine perforation. An emergency hysterectomy could be the fuinal emergency therapy for this adverse complication.There is a small risk for a post operative bladder infection. The lower abdomen may be uncomfortable or slight back ache for several days after surgery. A bloody vaginal discharge may be present for 1 - 2 weeks after surgery.    After completion of the d&C/Hysteroscopy, you will be already in positioned  for laparoscopic surgery. The procedure planned is insertion of a small scope into abdominal incision for evaluation of the pelvic and intra abdominal organs by visualization. If necessary, operative intervention through multiple incisions may be needed to correct any ab normal anatomical  condition.There is a remote risk of conversion to an abdominal laparotomy to perform any additional associated procedures. The procedure will place a trocars and cannulas (up to four or more) through a small skin incision in the abdomen i.e. through the skin, subcutaneous tissue, muscle and peritoneum. The procedure will visually evaluate the internal parts of the abdomen and pelvis. Cystoscopy may also be performed if deemed necessary for complete evaluation.After completion, the induced carbon dioxide gas will be released and you will be positioned supine ( flat on your back). Anesthesia will awaken you and then transport you to the recovery unit . When stable transfer will back to your admission room.    Risks:  The risks are small but include a remote chance for a skin infection, an abdominal wall hernia, vascular injury, bladder or bowel perforation or injury.There is a very remote risk of urinary tract infection, intra abdominal infection/abscess, possible intestinal obstruction, bowel injury - especially large bowel (colon), abdominal wall hematoma, pneumonitis or DVTs (deep vein thrombosis) or pulmonary embolus (blood clot(s) in lung). With the limited cystoscopy, there is a very remote risk of bladder perforation, trauma or risk of acute bladder infection with a limited visual inspection of the bladder and ureters postoperatively after completion of surgery. There is a emote risk for intra operative hemorrhage, post operative hemorrhage, wound infection, wound hematoma or wound seroma, hernia formation at the surgical port(s), pneumonitis, electrical mono polar injury or tissue thermal injury (burn).     The overall risk is less than 1 - 5 percent for any problem with the procedures.       Theodora Murray has voiced understanding and she does consent. Theodora Murray is aware that this conversation is impossible to include all possible items of risk.    This conversion with Dr. Claude Chery and Theodora Murray  occurred on 11/15/2022 at 11:13 AM as part of the completion of the pre-operative evaluation in preparation for surgery.       Claude Chery MD  Uro-Gynecology  [facility]

## 2022-11-15 NOTE — H&P (VIEW-ONLY)
[facility] - [facility]N  Obstetrics & Gynecology  History & Physical    Patient Name: Theodora Murray  MRN: [id number]  Admission Date:  11/16/2022 at [facility] outpatient [facility]  Gyn Surgeon: Claude Chery     Subjective:     Chief Complaint/Reason for Admission:  Abnormal uterine bleeding and dysmenorrhea/chronic pelvic pain- suspect endometriosis    History of Present Illness:  Theodora Murray 37 y.o. female is scheduled for exam under anesthesia with D and C/hysteroscopy as well as a laparoscopy. Theodora presents  for her preoperative exam at [facility].  Her gynecologic problem:  Dysmenorrhea and chronic pelvic pain as well as history of recurrent abnormal uterine bleeding.  Last menstrual period was 11/11/2022.  Patient considers the flow excessive as well as very painful.  Most recent ultrasound was performed on 10/05/2022.  Patient has a hypoechogenic area measuring 1.5 x 8 cm in the endocervix.     Recent sonography report:    Uterus measures 5.5 x 3.8 x 2.5 cm.  Endometrial stripe is normal in size measuring 0.4 cm.  Right ovary is poorly visualized.     The left ovary measures 3 x 1.4 x 2.0 cm.  Normal flow to the left ovary.     [place] hypoechogenic structure with possible Doppler flow arising from the cervix measuring 1.5 x 0.8 cm. This could represent complex nabothian cyst or possibly clotted blood with surrounding fluid. This is a nonspecific finding at this current time.  Direct visualization is recommended. Furthermore MRI of the pelvis without and with intravenous contrast should be considered.     Impression:     [place] hypoechogenic structure with possible Doppler flow arising from the cervix measuring 1.5 x 0.8 cm. This could represent complex nabothian cyst or possibly clotted blood with surrounding fluid. This is a nonspecific finding at this current time.  Direct visualization is recommended. Furthermore MRI of the pelvis without and with intravenous  "contrast should be considered.     Right ovary is poorly visualized.  Exam is otherwise normal.        Electronically signed by: Sagarnorberto Parrish  Date:                                            10/05/2022  Time:                                           16:04    Her gyn condition has been present for at least 2-3 year.    Patient does admit to dyspareunia in the past.  Patient is a .  She currently is not sexually active.    Patient admits to COVID vaccination.  She believes she possibly also had COVID-19 infection.    Past History:  Past Medical History:   Diagnosis Date    Chronic GERD     Depression     Generalized anxiety disorder     Hypertension     Hypokalemia     Unspecified hemorrhoids       History reviewed. No pertinent surgical history.   Family History   Problem Relation Age of Onset    Hypertension Mother     Osteoporosis Mother         Allergy;  Review of patient's allergies indicates:  No Known Allergies    Review of Systems - refer to gyn    Physical Exam:    BP (!) 122/91 (BP Location: Left arm, Patient Position: Sitting)   Pulse 96   Temp 97.2 °F (36.2 °C)   Resp 16   Ht 5' 8" (1.727 m)   Wt 101.1 kg (222 lb 12.8 oz)   LMP 11/11/2022   BMI 33.88 kg/m²     General Appearance: healthy, alert, no distress, smiling, BMI is 33.88    Psych: normal mood, behavior, speech, dress, motor activity, and thought processes    Neuro: alert, oriented x3  speech normal in context and clarity  memory intact grossly  reflexes: full and symmetric    HEENT: Head: Normocephalic, no lesions, without obvious abnormality.  Eye: Normal external eye, conjunctiva, lids cornea, DONI.  Ears: Normal TM's bilaterally. Normal auditory canals and external ears. Non-tender.  Pharynx: Dental Hygiene adequate. Normal buccal mucosa. Normal pharynx.  Neck / Thyroid: Supple, no masses, nodes, nodules or enlargement.    Chest:            Breast:  breasts appear normal, no suspicious masses, no skin or nipple changes or " axillary nodes.             Lungs: clear to auscultation bilaterally and normal percussion bilaterally            Heart:   regular rate and rhythm, S1, S2 normal, no murmur, click, rub or gallop, normal apical impulse, and regular rate and rhythm    Abdomen:soft, non-tender, without masses or organomegaly, normal bowel sounds, without guarding, and without rebound  Pelvic:            Vulva: normal appearing vulva with no masses, tenderness or lesions            Vagina:  Normal examination except for slight bloody discharge from the cervix           Cervix: normal appearing cervix without discharge or lesions, nulliparous os           Uterus:: uterus is normal size, shape, consistency and nontender, anteverted, mobile           Adenexa(e) normal adnexa in size, nontender and no masses           Rectal: normal rectal, no masses.     Extremity:normal, extremities warm, no clubbing, no cyanosis, no edema, non-tender    Skin:Skin color, texture, turgor normal. No rashes or lesions      .  Assessment:     Dysmenorrhea/menorrhagia/chronic pelvic pain - suspect endometriosis  Problem List Items Addressed This Visit          Renal/    Dysmenorrhea    Menometrorrhagia - Primary       GI    Chronic pelvic pain in female     Other Visit Diagnoses       Cervicitis with nabothian cyst        Abnormal imaging of the endocervix    Hypertension, unspecified type        Questionable            Plan:  Exam under anesthesia followed by a D&C/laparoscopy followed by exploratory laparoscopy    Consent for this Procedure(s):   Plan: D&C/Hysteroscopy/laparoscopy    Discussion of Surgical Consent for the procedure of D&C/Hysteroscopy/laparoscopy:    This is a minimally invasive outpatient procedure.    Anesthesia: General Anesthesia  After the onset of general anesthesia, the position will be for a pelvic exam.position with surgical skin prep. After a pelvic exam, then a speculum (weighted) will be inserted. The cervix will be visualized  and grasped with a tenaculum to hold the cervix for the procedure. Carefully using metal dialator, the cervical opening will be enlarged. Next a  scope will be inserted to view the uterine cavity. Following this phase of surgery, a scraping of the lining of the uterus (the endometrium) will be performed. This process is called a curettage. After completion, the grasper on the cervix will be removed and a rectal exam will check for any additional findings. The weighted speculum will be removed. After completion of surgery, the position will be flat on your back and then upon awakening, there will be transfer to the PACU (post operative recovery) and then the outpatient room (that you started from on upon admission). T    Risks:  The minimal risks include rare chance for a vaginal,or uterine infection, deeper tissue infection or abscess ( very rare) or small hematoma or bleeding due to puncture of the uterus and tissue around the uterus - very rare. The puncture of the uterus is occurring on average (national) once every 300 - 600 cases. Very rarely such a complication could result in an emergency laparoscopy or laparotomy correct the complication.A bowel or bladder injury could occur as a result of the puncture or uterine perforation. An emergency hysterectomy could be the fuinal emergency therapy for this adverse complication.There is a small risk for a post operative bladder infection. The lower abdomen may be uncomfortable or slight back ache for several days after surgery. A bloody vaginal discharge may be present for 1 - 2 weeks after surgery.    After completion of the d&C/Hysteroscopy, you will be already in positioned  for laparoscopic surgery. The procedure planned is insertion of a small scope into abdominal incision for evaluation of the pelvic and intra abdominal organs by visualization. If necessary, operative intervention through multiple incisions may be needed to correct any ab normal anatomical  condition.There is a remote risk of conversion to an abdominal laparotomy to perform any additional associated procedures. The procedure will place a trocars and cannulas (up to four or more) through a small skin incision in the abdomen i.e. through the skin, subcutaneous tissue, muscle and peritoneum. The procedure will visually evaluate the internal parts of the abdomen and pelvis. Cystoscopy may also be performed if deemed necessary for complete evaluation.After completion, the induced carbon dioxide gas will be released and you will be positioned supine ( flat on your back). Anesthesia will awaken you and then transport you to the recovery unit . When stable transfer will back to your admission room.    Risks:  The risks are small but include a remote chance for a skin infection, an abdominal wall hernia, vascular injury, bladder or bowel perforation or injury.There is a very remote risk of urinary tract infection, intra abdominal infection/abscess, possible intestinal obstruction, bowel injury - especially large bowel (colon), abdominal wall hematoma, pneumonitis or DVTs (deep vein thrombosis) or pulmonary embolus (blood clot(s) in lung). With the limited cystoscopy, there is a very remote risk of bladder perforation, trauma or risk of acute bladder infection with a limited visual inspection of the bladder and ureters postoperatively after completion of surgery. There is a emote risk for intra operative hemorrhage, post operative hemorrhage, wound infection, wound hematoma or wound seroma, hernia formation at the surgical port(s), pneumonitis, electrical mono polar injury or tissue thermal injury (burn).     The overall risk is less than 1 - 5 percent for any problem with the procedures.       Theodora Murray has voiced understanding and she does consent. Theodora Murray is aware that this conversation is impossible to include all possible items of risk.    This conversion with Dr. Claude Chery and Theodora Murray  occurred on 11/15/2022 at 11:13 AM as part of the completion of the pre-operative evaluation in preparation for surgery.       Claude Chery MD  Uro-Gynecology  [facility]

## 2022-11-15 NOTE — PATIENT INSTRUCTIONS
Dr. Claude Chery thanks you for this pre-operative visit at [facility].    Please follow the instructions before the planned surgery:  Exam under anesthesia followed by a D&C/hysteroscopy followed by laparoscopy    1) Please go the [facility] facility as arranged by Dr. Claude Chery's staff for there following:                                   A) Preoperative scheduled labs                                   B) Additional studies such as chest x-ray / EKG                                       that have been scheduled at [facility] admission office.                                     The location is first floor of the Outpatient building (part of the parking structure).      2) Please:             No meal or fluids to eat/drink after midnight of the night before the planned surgery.                                Do not eat breakfast (if hungry) or drink any fluids (if thirsty) on the the day of surgery!    3) Please:             DO NOT use your normal home medications the day of surgery. (You may bring the medications with you to the hospital.)    4) Please:             DO NOT  bring valuables such as rings, jewelry or valuable personal items with you to the hospital.    5) Please:            Arrive at the [facility] at 5:30 AM unless advised to arrive at a latter hour.    6) If Dr. Claude Chery has requested the use of any additional medications or actions he will advise you at this time.    7) If your plans change before surgery please call the office or the hospital as soon as possible so other surgery schedules can be adjusted.    Thank You    Dr. Claude Chery    11/15/2022 11:19 AM

## 2022-11-16 ENCOUNTER — ANESTHESIA EVENT (OUTPATIENT)
Dept: SURGERY | Facility: HOSPITAL | Age: 37
End: 2022-11-16
Payer: COMMERCIAL

## 2022-11-16 ENCOUNTER — ANESTHESIA (OUTPATIENT)
Dept: SURGERY | Facility: HOSPITAL | Age: 37
End: 2022-11-16
Payer: COMMERCIAL

## 2022-11-16 ENCOUNTER — HOSPITAL ENCOUNTER (OUTPATIENT)
Facility: HOSPITAL | Age: 37
Discharge: HOME | End: 2022-11-16
Attending: OBSTETRICS & GYNECOLOGY | Admitting: OBSTETRICS & GYNECOLOGY
Payer: COMMERCIAL

## 2022-11-16 VITALS
TEMPERATURE: 98 F | SYSTOLIC BLOOD PRESSURE: 141 MMHG | DIASTOLIC BLOOD PRESSURE: 84 MMHG | HEART RATE: 88 BPM | WEIGHT: 225 LBS | BODY MASS INDEX: 34.21 KG/M2 | OXYGEN SATURATION: 96 % | RESPIRATION RATE: 16 BRPM

## 2022-11-16 DIAGNOSIS — N94.6 DYSMENORRHEA: ICD-10-CM

## 2022-11-16 DIAGNOSIS — N92.1 MENOMETRORRHAGIA: ICD-10-CM

## 2022-11-16 DIAGNOSIS — N93.9 ABNORMAL UTERINE BLEEDING (AUB): ICD-10-CM

## 2022-11-16 DIAGNOSIS — G89.29 CHRONIC PELVIC PAIN IN FEMALE: ICD-10-CM

## 2022-11-16 DIAGNOSIS — N88.8 CERVICITIS WITH NABOTHIAN CYST: ICD-10-CM

## 2022-11-16 DIAGNOSIS — N72 CERVICITIS WITH NABOTHIAN CYST: ICD-10-CM

## 2022-11-16 DIAGNOSIS — N94.10 DYSPAREUNIA IN FEMALE: ICD-10-CM

## 2022-11-16 DIAGNOSIS — R10.2 CHRONIC PELVIC PAIN IN FEMALE: ICD-10-CM

## 2022-11-16 PROCEDURE — D9220A PRA ANESTHESIA: ICD-10-PCS | Mod: ANES,,, | Performed by: ANESTHESIOLOGY

## 2022-11-16 PROCEDURE — D9220A PRA ANESTHESIA: ICD-10-PCS | Mod: CRNA,,, | Performed by: NURSE ANESTHETIST, CERTIFIED REGISTERED

## 2022-11-16 PROCEDURE — 25000003 PHARM REV CODE 250: Performed by: OBSTETRICS & GYNECOLOGY

## 2022-11-16 PROCEDURE — 37000008 HC ANESTHESIA 1ST 15 MINUTES: Performed by: OBSTETRICS & GYNECOLOGY

## 2022-11-16 PROCEDURE — 37000009 HC ANESTHESIA EA ADD 15 MINS: Performed by: OBSTETRICS & GYNECOLOGY

## 2022-11-16 PROCEDURE — 88305 SURGICAL PATHOLOGY: ICD-10-PCS | Mod: 26,,, | Performed by: PATHOLOGY

## 2022-11-16 PROCEDURE — 63600175 PHARM REV CODE 636 W HCPCS: Performed by: ANESTHESIOLOGY

## 2022-11-16 PROCEDURE — 27000165 HC TUBE, ETT CUFFED: Performed by: NURSE ANESTHETIST, CERTIFIED REGISTERED

## 2022-11-16 PROCEDURE — 36000708 HC OR TIME LEV III 1ST 15 MIN: Performed by: OBSTETRICS & GYNECOLOGY

## 2022-11-16 PROCEDURE — 71000015 HC POSTOP RECOV 1ST HR: Performed by: OBSTETRICS & GYNECOLOGY

## 2022-11-16 PROCEDURE — 58558 PR HYSTEROSCOPY,W/ENDO BX: ICD-10-PCS | Mod: ,,, | Performed by: OBSTETRICS & GYNECOLOGY

## 2022-11-16 PROCEDURE — 27000655: Performed by: NURSE ANESTHETIST, CERTIFIED REGISTERED

## 2022-11-16 PROCEDURE — 49320 DIAG LAPARO SEPARATE PROC: CPT | Mod: 51,,, | Performed by: OBSTETRICS & GYNECOLOGY

## 2022-11-16 PROCEDURE — D9220A PRA ANESTHESIA: Mod: CRNA,,, | Performed by: NURSE ANESTHETIST, CERTIFIED REGISTERED

## 2022-11-16 PROCEDURE — 88305 TISSUE EXAM BY PATHOLOGIST: CPT | Mod: 26,,, | Performed by: PATHOLOGY

## 2022-11-16 PROCEDURE — 71000016 HC POSTOP RECOV ADDL HR: Performed by: OBSTETRICS & GYNECOLOGY

## 2022-11-16 PROCEDURE — 71000033 HC RECOVERY, INTIAL HOUR: Performed by: OBSTETRICS & GYNECOLOGY

## 2022-11-16 PROCEDURE — 58558 HYSTEROSCOPY BIOPSY: CPT | Mod: ,,, | Performed by: OBSTETRICS & GYNECOLOGY

## 2022-11-16 PROCEDURE — 88305 TISSUE EXAM BY PATHOLOGIST: CPT | Mod: SUR | Performed by: OBSTETRICS & GYNECOLOGY

## 2022-11-16 PROCEDURE — D9220A PRA ANESTHESIA: Mod: ANES,,, | Performed by: ANESTHESIOLOGY

## 2022-11-16 PROCEDURE — 27000689 HC BLADE LARYNGOSCOPE ANY SIZE: Performed by: NURSE ANESTHETIST, CERTIFIED REGISTERED

## 2022-11-16 PROCEDURE — 63600175 PHARM REV CODE 636 W HCPCS: Performed by: OBSTETRICS & GYNECOLOGY

## 2022-11-16 PROCEDURE — 63600175 PHARM REV CODE 636 W HCPCS: Performed by: NURSE ANESTHETIST, CERTIFIED REGISTERED

## 2022-11-16 PROCEDURE — 36000709 HC OR TIME LEV III EA ADD 15 MIN: Performed by: OBSTETRICS & GYNECOLOGY

## 2022-11-16 PROCEDURE — 49320 PR LAP,DIAGNOSTIC ABDOMEN: ICD-10-PCS | Mod: 51,,, | Performed by: OBSTETRICS & GYNECOLOGY

## 2022-11-16 PROCEDURE — 25000003 PHARM REV CODE 250: Performed by: NURSE ANESTHETIST, CERTIFIED REGISTERED

## 2022-11-16 RX ORDER — PHENYLEPHRINE HYDROCHLORIDE 10 MG/ML
INJECTION INTRAVENOUS
Status: DISCONTINUED | OUTPATIENT
Start: 2022-11-16 | End: 2022-11-16

## 2022-11-16 RX ORDER — FENTANYL CITRATE 50 UG/ML
INJECTION, SOLUTION INTRAMUSCULAR; INTRAVENOUS
Status: DISCONTINUED | OUTPATIENT
Start: 2022-11-16 | End: 2022-11-16

## 2022-11-16 RX ORDER — MORPHINE SULFATE 10 MG/ML
4 INJECTION INTRAMUSCULAR; INTRAVENOUS; SUBCUTANEOUS EVERY 5 MIN PRN
Status: DISCONTINUED | OUTPATIENT
Start: 2022-11-16 | End: 2022-11-16 | Stop reason: HOSPADM

## 2022-11-16 RX ORDER — SODIUM CHLORIDE 9 MG/ML
INJECTION, SOLUTION INTRAVENOUS CONTINUOUS
Status: DISCONTINUED | OUTPATIENT
Start: 2022-11-16 | End: 2022-11-16 | Stop reason: HOSPADM

## 2022-11-16 RX ORDER — ONDANSETRON 2 MG/ML
4 INJECTION INTRAMUSCULAR; INTRAVENOUS DAILY PRN
Status: DISCONTINUED | OUTPATIENT
Start: 2022-11-16 | End: 2022-11-16 | Stop reason: HOSPADM

## 2022-11-16 RX ORDER — KETOROLAC TROMETHAMINE 30 MG/ML
INJECTION, SOLUTION INTRAMUSCULAR; INTRAVENOUS
Status: DISCONTINUED | OUTPATIENT
Start: 2022-11-16 | End: 2022-11-16

## 2022-11-16 RX ORDER — TRAMADOL HYDROCHLORIDE 50 MG/1
50 TABLET ORAL EVERY 4 HOURS PRN
Status: DISCONTINUED | OUTPATIENT
Start: 2022-11-16 | End: 2022-11-16 | Stop reason: HOSPADM

## 2022-11-16 RX ORDER — MIDAZOLAM HYDROCHLORIDE 1 MG/ML
INJECTION INTRAMUSCULAR; INTRAVENOUS
Status: DISCONTINUED | OUTPATIENT
Start: 2022-11-16 | End: 2022-11-16

## 2022-11-16 RX ORDER — BUPIVACAINE HYDROCHLORIDE 2.5 MG/ML
INJECTION, SOLUTION EPIDURAL; INFILTRATION; INTRACAUDAL
Status: DISCONTINUED | OUTPATIENT
Start: 2022-11-16 | End: 2022-11-16 | Stop reason: HOSPADM

## 2022-11-16 RX ORDER — ROCURONIUM BROMIDE 10 MG/ML
INJECTION, SOLUTION INTRAVENOUS
Status: DISCONTINUED | OUTPATIENT
Start: 2022-11-16 | End: 2022-11-16

## 2022-11-16 RX ORDER — DIPHENHYDRAMINE HYDROCHLORIDE 50 MG/ML
25 INJECTION INTRAMUSCULAR; INTRAVENOUS EVERY 4 HOURS PRN
Status: DISCONTINUED | OUTPATIENT
Start: 2022-11-16 | End: 2022-11-16 | Stop reason: HOSPADM

## 2022-11-16 RX ORDER — KETOROLAC TROMETHAMINE 30 MG/ML
60 INJECTION, SOLUTION INTRAMUSCULAR; INTRAVENOUS ONCE
Status: DISCONTINUED | OUTPATIENT
Start: 2022-11-16 | End: 2022-11-16 | Stop reason: HOSPADM

## 2022-11-16 RX ORDER — ONDANSETRON 2 MG/ML
4 INJECTION INTRAMUSCULAR; INTRAVENOUS EVERY 12 HOURS PRN
Status: DISCONTINUED | OUTPATIENT
Start: 2022-11-16 | End: 2022-11-16 | Stop reason: HOSPADM

## 2022-11-16 RX ORDER — DICLOFENAC POTASSIUM 50 MG/1
50 TABLET, FILM COATED ORAL 4 TIMES DAILY
Qty: 40 TABLET | Refills: 0 | Status: SHIPPED | OUTPATIENT
Start: 2022-11-16 | End: 2022-11-26

## 2022-11-16 RX ORDER — LIDOCAINE HYDROCHLORIDE 40 MG/ML
SOLUTION TOPICAL
Status: DISCONTINUED | OUTPATIENT
Start: 2022-11-16 | End: 2022-11-16 | Stop reason: HOSPADM

## 2022-11-16 RX ORDER — ONDANSETRON 2 MG/ML
INJECTION INTRAMUSCULAR; INTRAVENOUS
Status: DISCONTINUED | OUTPATIENT
Start: 2022-11-16 | End: 2022-11-16

## 2022-11-16 RX ORDER — LIDOCAINE HYDROCHLORIDE 10 MG/ML
1 INJECTION INFILTRATION; PERINEURAL ONCE
Status: DISCONTINUED | OUTPATIENT
Start: 2022-11-16 | End: 2022-11-16 | Stop reason: HOSPADM

## 2022-11-16 RX ORDER — CEFAZOLIN SODIUM 1 G/3ML
INJECTION, POWDER, FOR SOLUTION INTRAMUSCULAR; INTRAVENOUS
Status: DISCONTINUED | OUTPATIENT
Start: 2022-11-16 | End: 2022-11-16

## 2022-11-16 RX ORDER — DIPHENHYDRAMINE HYDROCHLORIDE 50 MG/ML
25 INJECTION INTRAMUSCULAR; INTRAVENOUS EVERY 6 HOURS PRN
Status: DISCONTINUED | OUTPATIENT
Start: 2022-11-16 | End: 2022-11-16 | Stop reason: HOSPADM

## 2022-11-16 RX ORDER — DEXAMETHASONE SODIUM PHOSPHATE 4 MG/ML
INJECTION, SOLUTION INTRA-ARTICULAR; INTRALESIONAL; INTRAMUSCULAR; INTRAVENOUS; SOFT TISSUE
Status: DISCONTINUED | OUTPATIENT
Start: 2022-11-16 | End: 2022-11-16

## 2022-11-16 RX ORDER — IPRATROPIUM BROMIDE AND ALBUTEROL SULFATE 2.5; .5 MG/3ML; MG/3ML
3 SOLUTION RESPIRATORY (INHALATION) ONCE AS NEEDED
Status: DISCONTINUED | OUTPATIENT
Start: 2022-11-16 | End: 2022-11-16 | Stop reason: HOSPADM

## 2022-11-16 RX ORDER — SODIUM CHLORIDE, SODIUM LACTATE, POTASSIUM CHLORIDE, CALCIUM CHLORIDE 600; 310; 30; 20 MG/100ML; MG/100ML; MG/100ML; MG/100ML
INJECTION, SOLUTION INTRAVENOUS CONTINUOUS
Status: DISCONTINUED | OUTPATIENT
Start: 2022-11-16 | End: 2022-11-16 | Stop reason: HOSPADM

## 2022-11-16 RX ORDER — PROPOFOL 10 MG/ML
VIAL (ML) INTRAVENOUS
Status: DISCONTINUED | OUTPATIENT
Start: 2022-11-16 | End: 2022-11-16

## 2022-11-16 RX ORDER — CEFAZOLIN SODIUM 2 G/50ML
2 SOLUTION INTRAVENOUS
Status: DISCONTINUED | OUTPATIENT
Start: 2022-11-16 | End: 2022-11-16 | Stop reason: HOSPADM

## 2022-11-16 RX ORDER — LIDOCAINE HYDROCHLORIDE 20 MG/ML
INJECTION, SOLUTION EPIDURAL; INFILTRATION; INTRACAUDAL; PERINEURAL
Status: DISCONTINUED | OUTPATIENT
Start: 2022-11-16 | End: 2022-11-16

## 2022-11-16 RX ORDER — HYDROMORPHONE HYDROCHLORIDE 2 MG/ML
0.5 INJECTION, SOLUTION INTRAMUSCULAR; INTRAVENOUS; SUBCUTANEOUS EVERY 5 MIN PRN
Status: DISCONTINUED | OUTPATIENT
Start: 2022-11-16 | End: 2022-11-16 | Stop reason: HOSPADM

## 2022-11-16 RX ORDER — MUPIROCIN 20 MG/G
OINTMENT TOPICAL
Status: DISCONTINUED | OUTPATIENT
Start: 2022-11-16 | End: 2022-11-16 | Stop reason: HOSPADM

## 2022-11-16 RX ORDER — MEPERIDINE HYDROCHLORIDE 25 MG/ML
25 INJECTION INTRAMUSCULAR; INTRAVENOUS; SUBCUTANEOUS ONCE AS NEEDED
Status: DISCONTINUED | OUTPATIENT
Start: 2022-11-16 | End: 2022-11-16 | Stop reason: HOSPADM

## 2022-11-16 RX ADMIN — KETOROLAC TROMETHAMINE 30 MG: 30 INJECTION, SOLUTION INTRAMUSCULAR at 08:11

## 2022-11-16 RX ADMIN — PROPOFOL 200 MG: 10 INJECTION, EMULSION INTRAVENOUS at 07:11

## 2022-11-16 RX ADMIN — FENTANYL CITRATE 100 MCG: 50 INJECTION INTRAMUSCULAR; INTRAVENOUS at 07:11

## 2022-11-16 RX ADMIN — PHENYLEPHRINE HYDROCHLORIDE 100 MCG: 10 INJECTION INTRAVENOUS at 08:11

## 2022-11-16 RX ADMIN — SODIUM CHLORIDE, POTASSIUM CHLORIDE, SODIUM LACTATE AND CALCIUM CHLORIDE: 600; 310; 30; 20 INJECTION, SOLUTION INTRAVENOUS at 07:11

## 2022-11-16 RX ADMIN — ROCURONIUM BROMIDE 40 MG: 10 INJECTION, SOLUTION INTRAVENOUS at 07:11

## 2022-11-16 RX ADMIN — DEXAMETHASONE SODIUM PHOSPHATE 8 MG: 4 INJECTION, SOLUTION INTRA-ARTICULAR; INTRALESIONAL; INTRAMUSCULAR; INTRAVENOUS; SOFT TISSUE at 07:11

## 2022-11-16 RX ADMIN — ONDANSETRON 4 MG: 2 INJECTION INTRAMUSCULAR; INTRAVENOUS at 10:11

## 2022-11-16 RX ADMIN — MIDAZOLAM 2 MG: 1 INJECTION INTRAMUSCULAR; INTRAVENOUS at 07:11

## 2022-11-16 RX ADMIN — LIDOCAINE HYDROCHLORIDE 50 MG: 20 INJECTION, SOLUTION EPIDURAL; INFILTRATION; INTRACAUDAL; PERINEURAL at 07:11

## 2022-11-16 RX ADMIN — SUGAMMADEX 200 MG: 100 INJECTION, SOLUTION INTRAVENOUS at 08:11

## 2022-11-16 RX ADMIN — ONDANSETRON 4 MG: 2 INJECTION INTRAMUSCULAR; INTRAVENOUS at 07:11

## 2022-11-16 RX ADMIN — SODIUM CHLORIDE, POTASSIUM CHLORIDE, SODIUM LACTATE AND CALCIUM CHLORIDE: 600; 310; 30; 20 INJECTION, SOLUTION INTRAVENOUS at 08:11

## 2022-11-16 RX ADMIN — CEFAZOLIN 2 G: 1 INJECTION, POWDER, FOR SOLUTION INTRAMUSCULAR; INTRAVENOUS; PARENTERAL at 07:11

## 2022-11-16 NOTE — TRANSFER OF CARE
Anesthesia Transfer of Care Note    Patient: Theodora Murray    Procedure(s) Performed: Procedure(s) (LRB):  LAPAROSCOPY, DIAGNOSTIC (N/A)  HYSTEROSCOPY, WITH DILATION AND CURETTAGE OF UTERUS (N/A)    Patient location: PACU    Anesthesia Type: general    Transport from OR: Transported from OR on 6-10 L/min O2 by face mask with adequate spontaneous ventilation    Post pain: adequate analgesia    Post assessment: no apparent anesthetic complications    Post vital signs: stable    Level of consciousness: responds to stimulation and awake    Nausea/Vomiting: no nausea/vomiting    Complications: none    Transfer of care protocol was followedComments: Good SV continue, NAD noted, VSS, RTRN      Last vitals:   Visit Vitals  /88   Pulse 78   Temp 36.7 °C (98.1 °F)   Resp 15   Wt 102.1 kg (225 lb)   LMP 11/11/2022   SpO2 97%   Breastfeeding No   BMI 34.21 kg/m²

## 2022-11-16 NOTE — OP NOTE
[facility] Services  Gynecology  Operative Report      Patient Name: Theodora Murray  MRN: [id number]  Admission Date: 11/16/2022    Date of Service:11/16/2022     Operative report:    Pre op Diagnosis:  Chronic dysmenorrhea; chronic pelvic pain; menometrorrhagia; endocervical abnormality; hypertension; obesity    Post op Diagnosis:  Postop diagnosis the same; inspissated cervical mucus plug in the endocervix    Procedure:  Exam under anesthesia with Dilation and Curettage with Hysteroscopy and Laparoscopy    Surgeon: Dr Claude Chery    Anesthesia: General    Findings:  Hypermobile uterus; inspissated cervical mucus plugging in the endocervix; no endometriosis; no endometrial pathology; normal appendix    Complication(s): none    Condition: Excellent    Estimated Blood loss:  5 cc-negligible    Description::     After the onset of general anesthesia, the patient was positioned into dorsal lithotomy position.    The abdomen was prepped with surgical Chloroprep solution. The vulva was not clipped of hair and then prepped with a Betadine solution    The patient was draped for surgery.    Pelvic revealed the following:external genitalia normal, rectovaginal septum normal, uterus normal size, shape, and consistency, vagina normal without discharge, and hypermobile uterus .  Uterus was anteflexed    A Worthington catheter was placed into the bladder and the bladder was drained throughout the procedures.      A weighted speculum was inserted. The cervix was grasped with a single tooth tenaculum.  The endometrial cavity was sounded to 8 cm's.  The endocervix was dilated with Hegar dilators to 8 mm's.     A 12 degree lens hysteroscope was inserted into the endocervical canal. Under direct vision with saline infusion, the hysteroscope was passed into the endometrial canal. The exam revealed inspissated cervical mucus at the internal cervical os this was removed; endometrium was unremarkable appearance with  visualization of tubal ostia.    The hysteroscope was removed. A curette was inserted and a curettage was performed on the endometrial cavity. The hysteroscope was reinserted and there was no evidence of uterine perforation. The curettage appeared adequate. The hysteroscope was removed.    Next a Humi canula was inserted into the uterine cavity via the endocervix. 3 cc of normal saline was used to inflate the endometrial canal.    The patient was placed into a steep Trendelenburg position.     A Verres needle was inserted into the abdomen at this location-transverse inferior aspect of umbilicus. A pneumoperitoneum was established. The final volume was 3 liters of carbon dioxide with loss of liver edge dullness by manual percussion. The Verres needle was removed. The planned surgiport(s)  received injection  of 0.5 percent marcaine without Epinephrine - 10 cc. A knife incision was made and a 5 mm [employer] surgiport was connected to the laparoscope was used to create and sustained a penetration into the abdomen. The trochar was removed.  The approach angle was 45° to the pelvis.    The laparoscopic exam was re-inserted.     The exam revealed:  Hypermobile anteflexed uterus with no evidence of endometriosis.  Fallopian tubes for unremarkable as was the fimbria bilaterally.  A few small hydatid cyst on the left fallopian tube.  Both ovaries were normal.  Anterior posterior cul-de-sac was considered normal.  The appendix was normal.  Upper exam revealed normal liver lobes and gallbladder was not visualized due to adiposity of the omentum.    Following completion of the exam, the laparoscope was removed and the pneumoperitoneum.was removed.  6 cc of 4% lidocaine was injected prior to removal of the cannula of the Surgiport.    Patient was then placed supine.    The incision was closed. The deeper fascia was closed with inverted  4-0 Monocryl suture. The upper fat / subcutaneus tissue layer was closed with a 4 0  Monocryl ligature. The skin edges were closed with a subcuticular running  of 4 0 Monocryl. Tissue glue sealed the skin incision.    The tenaculum was removed off of the cervix.. The Humi canula was removed . A vaginal and rectal exam revealed  no new findings. The Worthington was also removed after the balloon was deflated.    The patient was placed supine; she was awakened and transferred to the PACU.      Operative report created by Claude Chery on çat 8:28 AM .                  .

## 2022-11-16 NOTE — PATIENT INSTRUCTIONS
Post op instructions for discharge from the hospital ([facility]) for Theodora Murray    1) Shower for the next 2 weeks or until vaginal bleeding or spotting stops. Keep the surgical incision in the umbilicus dry.    2) Avoid sexual intercourse for 2 weeks weeks    3) Avoid driving a vehicle for 3 days due to effects of anesthesia    4) Resume other activities as tolerated    5) Resume your normal home diet    6) Resume your home medication that you used prior to admission to the hospital    7) Dr. Claude Chery may have prescribed new medication(s) -use as directed   Diclofenac potassium# 20 every 6 hours    8) Please keep your scheduled appointment for post operative checkup            Call the hospital outpatient department ([phone]) for any adverse (abnormal) problems or Dr. Claude Chery's office.    Adverse problem:    1) persistent nausea or vomiting  2) worsening abdominal or pelvic pains  3) incisional redness, incisional drainage or incision separation or incisional swelling  4) urinary symptoms  5) worsening vaginal bleeding     Best regards Dr. Claude Chery

## 2022-11-16 NOTE — ANESTHESIA PROCEDURE NOTES
Intubation    Date/Time: 11/16/2022 7:28 AM  Performed by: Sherin Cox CRNA  Authorized by: Sherin Cox CRNA     Intubation:     Induction:  Intravenous    Intubated:  Postinduction    Mask Ventilation:  Easy mask    Attempts:  1    Attempted By:  CRNA    Method of Intubation:  Direct    Blade:  Eduardo 3    Laryngeal View Grade: Grade I - full view of cords      Difficult Airway Encountered?: No      Complications:  None    Airway Device:  Oral endotracheal tube    Airway Device Size:  7.0    Style/Cuff Inflation:  Cuffed (inflated to minimal occlusive pressure)    Tube secured:  22    Secured at:  The lips    Placement Verified By:  Capnometry    Complicating Factors:  None    Findings Post-Intubation:  BS equal bilateral and atraumatic/condition of teeth unchanged

## 2022-11-16 NOTE — ANESTHESIA POSTPROCEDURE EVALUATION
Anesthesia Post Evaluation    Patient: Theodora Murray    Procedure(s) Performed: Procedure(s) (LRB):  LAPAROSCOPY, DIAGNOSTIC (N/A)  HYSTEROSCOPY, WITH DILATION AND CURETTAGE OF UTERUS (N/A)    Final Anesthesia Type: general      Patient location during evaluation: PACU  Patient participation: Yes- Able to Participate  Level of consciousness: awake and alert and oriented  Post-procedure vital signs: reviewed and stable  Pain management: adequate  Airway patency: patent  KATELYN mitigation strategies: Multimodal analgesia  PONV status at discharge: No PONV  Anesthetic complications: no      Cardiovascular status: hemodynamically stable  Respiratory status: unassisted and spontaneous ventilation  Hydration status: euvolemic  Follow-up not needed.          Vitals Value Taken Time   /93 11/16/22 0857   Temp 36.7 °C (98.1 °F) 11/16/22 0836   Pulse 81 11/16/22 0900   Resp 22 11/16/22 0900   SpO2 92 % 11/16/22 0900   Vitals shown include unvalidated device data.      No case tracking events are documented in the log.      Pain/Mikhail Score: No data recorded

## 2022-11-16 NOTE — DISCHARGE SUMMARY
[facility]  Discharge Note  Short Stay    Procedure(s) (LRB):  LAPAROSCOPY, DIAGNOSTIC (N/A)  HYSTEROSCOPY, WITH DILATION AND CURETTAGE OF UTERUS (N/A)      OUTCOME: Patient tolerated treatment/procedure well without complication and is now ready for discharge.    DISPOSITION: Home or Self Care    FINAL DIAGNOSIS:  Dysmenorrhea    FOLLOWUP:  Follow-up in clinic with Dr. Claude Chery in 2 weeks    DISCHARGE INSTRUCTIONS:    Post op instructions for discharge from the hospital ([facility]) for Theodora Murray    1) Shower for the next 2 weeks or until vaginal bleeding or spotting stops. Keep the surgical incision in the umbilicus dry.    2) Avoid sexual intercourse for 2 weeks weeks    3) Avoid driving a vehicle for 3 days due to effects of anesthesia    4) Resume other activities as tolerated    5) Resume your normal home diet    6) Resume your home medication that you used prior to admission to the hospital    7) Dr. Claude Chery may have prescribed new medication(s) -use as directed   Diclofenac potassium# 20 every 6 hours    8) Please keep your scheduled appointment for post operative checkup            Call the hospital outpatient department ([phone]) for any adverse (abnormal) problems or Dr. Claude Chery's office.    Adverse problem:    1) persistent nausea or vomiting  2) worsening abdominal or pelvic pains  3) incisional redness, incisional drainage or incision separation or incisional swelling  4) urinary symptoms  5) worsening vaginal bleeding     Best regards Dr. Claude Chery     TIME SPENT ON DISCHARGE:  20 minutes involved in dictation postop

## 2022-11-16 NOTE — BRIEF OP NOTE
[facility]  Gynecology  Immediate Post Operative Note      Patient Name: Theodora Murray  MRN: [id number]  Admission Date: 11/16/2022    Pre op Diagnosis:  Dysmenorrhea; chronic pelvic pain; menometrorrhagia; obesity; hypertension; depression/general anxiety disorder  Post op Diagnosis:  Postop diagnosis the same; hypermobile uterus; inspissated cervical mucus plug at internal os of endocervix; no pelvic endometriosis    Procedure: Exam under anesthesia followed by dilatation and curettage with hysteroscopy and removal of inspissated mucus plug; exploratory laparoscopy    Surgeon: Dr Claude Chery    Assistant:  None      Anesthesia:General    Findings:Hypermobile uterus; inspissated cervical mucus plugging in the endocervix; no endometriosis; no endometrial pathology; hydadid small cysts (4) on left fallopian tube normal appendix    Complication(s): none    Condition:Excellent    Estimated Blood loss:  5 cc-negligible    Specimen:  Endometrial curettings and in patient insippated endocervical mucus sent to pathology for evaluation

## 2022-11-16 NOTE — ANESTHESIA PREPROCEDURE EVALUATION
11/16/2022  Theodora Murray is a 37 y.o., female.      Pre-op Assessment    I have reviewed the Patient Summary Reports.     I have reviewed the Nursing Notes. I have reviewed the NPO Status.   I have reviewed the Medications.     Review of Systems  Anesthesia Hx:  No problems with previous Anesthesia  Denies Family Hx of Anesthesia complications.   Denies Personal Hx of Anesthesia complications.   Social:  Smoker, No Alcohol Use    Cardiovascular:   Exercise tolerance: good Hypertension    Hepatic/GI:   GERD    Endocrine:  Obesity / BMI > 30  Psych:   Psychiatric History          Physical Exam  General: Well nourished, Cooperative and Alert    Airway:  Mallampati: II   Mouth Opening: Normal  TM Distance: Normal  Tongue: Normal  Neck ROM: Normal ROM    Dental:  Intact    Chest/Lungs:  Clear to auscultation, Normal Respiratory Rate    Heart:  Rate: Normal  Rhythm: Regular Rhythm        Chemistry        Component Value Date/Time     11/15/2022 1534    K 3.2 (L) 11/15/2022 1534     11/15/2022 1534    CO2 28 11/15/2022 1534    BUN 12 11/15/2022 1534    CREATININE 0.64 11/15/2022 1534    GLU 91 11/15/2022 1534        Component Value Date/Time    CALCIUM 9.6 11/15/2022 1534    ALKPHOS 79 11/15/2022 1534    AST 17 11/15/2022 1534    ALT 40 11/15/2022 1534    BILITOT 0.3 11/15/2022 1534        Lab Results   Component Value Date    WBC 10.78 11/15/2022    RBC 4.84 11/15/2022    HGB 13.6 11/15/2022    MCV 90.5 11/15/2022    MCH 28.1 11/15/2022    MCHC 31.1 (L) 11/15/2022    RDW 14.8 (H) 11/15/2022     (H) 11/15/2022    MPV 10.0 11/15/2022    LYMPH 15.5 (L) 11/15/2022    LYMPH 1.67 11/15/2022    MONO 7.1 (H) 11/15/2022    EOS 0.11 11/15/2022    BASO 0.06 11/15/2022         Anesthesia Plan  Type of Anesthesia, risks & benefits discussed:    Anesthesia Type: Gen ETT  Intra-op Monitoring Plan: Standard ASA  Monitors  Post Op Pain Control Plan: multimodal analgesia  Induction:  IV  Airway Plan: Direct, Post-Induction  Informed Consent: Informed consent signed with the Patient and all parties understand the risks and agree with anesthesia plan.  All questions answered.   ASA Score: 2  Day of Surgery Review of History & Physical: H&P Update referred to the surgeon/provider.I have interviewed and examined the patient. I have reviewed the patient's H&P dated: There are no significant changes.     Ready For Surgery From Anesthesia Perspective.     .

## 2022-11-16 NOTE — OR NURSING
0830 PT TO PACU ASLEEP, AROUSABLE TO TOUCH/VOICE. ORAL AIRWAY IN PLACE. O2 AT 8L VIA FACEMASK. RESP EVEN AND UNLABORED. IV INFUSING TO R HAND. DERMABOND IN PLACE TO UMBILICUS. NO BLEEDING NOTED. ALEENA-PAD IN PLACE C/D/I. VSS. SAFETY MEASURES IN PLACE.    0840 PT AWAKE, DROWSY. ORAL AIRWAY REMOVED. VSS. PT DENIES PAIN AT THIS TIME.     0902 OUT OF PACU    0910 PT TRANSFERRED TO ASC 2. RELEASED TO DONY SOUZA RN. PT AWAKE AND ALERT. RESP EVEN AND UNLABORED. IV INFUSING TO R HAND. DERMABOND TO UMBILICUS C/D/I. NO BLEEDING NOTED. ALEENA-PAD IN PLACE C/D/I. VS: /95, HR 86, RR 16, SAO2 100% ON 2L NC. SAFETY MEASURES IN PLACE. FAMILY AT BEDSIDE.

## 2022-11-17 LAB
ESTROGEN SERPL-MCNC: NORMAL PG/ML
INSULIN SERPL-ACNC: NORMAL U[IU]/ML
LAB AP GROSS DESCRIPTION: NORMAL
LAB AP LABORATORY NOTES: NORMAL
T3RU NFR SERPL: NORMAL %

## 2022-11-23 ENCOUNTER — OFFICE VISIT (OUTPATIENT)
Dept: OBSTETRICS AND GYNECOLOGY | Facility: CLINIC | Age: 37
End: 2022-11-23
Payer: COMMERCIAL

## 2022-11-23 VITALS
BODY MASS INDEX: 33.16 KG/M2 | RESPIRATION RATE: 16 BRPM | WEIGHT: 218.81 LBS | DIASTOLIC BLOOD PRESSURE: 90 MMHG | TEMPERATURE: 97 F | SYSTOLIC BLOOD PRESSURE: 140 MMHG | HEART RATE: 111 BPM | HEIGHT: 68 IN

## 2022-11-23 DIAGNOSIS — R10.13 DYSPEPSIA: ICD-10-CM

## 2022-11-23 DIAGNOSIS — R10.2 CHRONIC PELVIC PAIN IN FEMALE: ICD-10-CM

## 2022-11-23 DIAGNOSIS — L30.9 DERMATITIS: ICD-10-CM

## 2022-11-23 DIAGNOSIS — N92.1 METRORRHAGIA: Primary | ICD-10-CM

## 2022-11-23 DIAGNOSIS — G89.29 CHRONIC PELVIC PAIN IN FEMALE: ICD-10-CM

## 2022-11-23 PROCEDURE — 3008F PR BODY MASS INDEX (BMI) DOCUMENTED: ICD-10-PCS | Mod: CPTII,,, | Performed by: OBSTETRICS & GYNECOLOGY

## 2022-11-23 PROCEDURE — 1160F RVW MEDS BY RX/DR IN RCRD: CPT | Mod: CPTII,,, | Performed by: OBSTETRICS & GYNECOLOGY

## 2022-11-23 PROCEDURE — 1159F MED LIST DOCD IN RCRD: CPT | Mod: CPTII,,, | Performed by: OBSTETRICS & GYNECOLOGY

## 2022-11-23 PROCEDURE — 3008F BODY MASS INDEX DOCD: CPT | Mod: CPTII,,, | Performed by: OBSTETRICS & GYNECOLOGY

## 2022-11-23 PROCEDURE — 1159F PR MEDICATION LIST DOCUMENTED IN MEDICAL RECORD: ICD-10-PCS | Mod: CPTII,,, | Performed by: OBSTETRICS & GYNECOLOGY

## 2022-11-23 PROCEDURE — 99024 PR POST-OP FOLLOW-UP VISIT: ICD-10-PCS | Mod: ,,, | Performed by: OBSTETRICS & GYNECOLOGY

## 2022-11-23 PROCEDURE — 99024 POSTOP FOLLOW-UP VISIT: CPT | Mod: ,,, | Performed by: OBSTETRICS & GYNECOLOGY

## 2022-11-23 PROCEDURE — 3077F SYST BP >= 140 MM HG: CPT | Mod: CPTII,,, | Performed by: OBSTETRICS & GYNECOLOGY

## 2022-11-23 PROCEDURE — 3077F PR MOST RECENT SYSTOLIC BLOOD PRESSURE >= 140 MM HG: ICD-10-PCS | Mod: CPTII,,, | Performed by: OBSTETRICS & GYNECOLOGY

## 2022-11-23 PROCEDURE — 1160F PR REVIEW ALL MEDS BY PRESCRIBER/CLIN PHARMACIST DOCUMENTED: ICD-10-PCS | Mod: CPTII,,, | Performed by: OBSTETRICS & GYNECOLOGY

## 2022-11-23 PROCEDURE — 3080F DIAST BP >= 90 MM HG: CPT | Mod: CPTII,,, | Performed by: OBSTETRICS & GYNECOLOGY

## 2022-11-23 PROCEDURE — 3080F PR MOST RECENT DIASTOLIC BLOOD PRESSURE >= 90 MM HG: ICD-10-PCS | Mod: CPTII,,, | Performed by: OBSTETRICS & GYNECOLOGY

## 2022-11-23 RX ORDER — LANSOPRAZOLE 15 MG/1
15 TABLET, ORALLY DISINTEGRATING, DELAYED RELEASE ORAL 2 TIMES DAILY
Qty: 60 TABLET | Refills: 0 | Status: SHIPPED | OUTPATIENT
Start: 2022-11-23 | End: 2022-12-23

## 2022-11-23 RX ORDER — FLUCONAZOLE 100 MG/1
100 TABLET ORAL DAILY
Qty: 10 TABLET | Refills: 0 | Status: SHIPPED | OUTPATIENT
Start: 2022-11-23 | End: 2022-12-03

## 2022-11-23 NOTE — PROGRESS NOTES
Theodora Murray female  for   Chief Complaint   Patient presents with    Post-op Evaluation     1 wk D&C Hyster concern about amount of bleeding she is having          PHI:  37-year-old nulligravida  female is approximately 1 week from a D&C/hysteroscopy with laparoscopy.  She is concerned postop that she is had some excessive vaginal bleeding and presents today for re-evaluation of vaginal bleeding.  Patient is complaining of dyspepsia.  She has a chronic history of dyspepsia.  She feels diclofenac potassium is making the dyspepsia worse.  She wants another method of analgesia.    The visual findings from the laparoscopy: Hypermobile anteflexed uterus with no evidence of endometriosis.  Fallopian tubes for unremarkable as was the fimbria bilaterally.  A few small hydatid cyst on the left fallopian tube.  Both ovaries were normal.  Anterior posterior cul-de-sac was considered normal.  The appendix was normal.  Upper exam revealed normal liver lobes and gallbladder was not visualized due to adiposity of the omentum.    The pathology report:  Diagnosis   A. Excessive mucus endocervical canal:  - Minute fragments of benign-appearing endocervical tissue admixed with mucus and acute inflammation     B. Endometrial curettings:  - Inactive endometrium with suppressed glands suggestive of progestin effect  - Endocervical tissue without diagnostic abnormality     C. Endocervical curettings:  - Minute fragments of benign-appearing endocervical tissue admixed with mucus and blood            Past Medical History:   Diagnosis Date    Chronic GERD     Depression     Generalized anxiety disorder     Hypertension     Hypokalemia     Unspecified hemorrhoids       Past Surgical History:   Procedure Laterality Date    DIAGNOSTIC LAPAROSCOPY N/A 11/16/2022    Procedure: LAPAROSCOPY, DIAGNOSTIC;  Surgeon: Claude Chery MD;  Location: [facility];  Service: OB/GYN;  Laterality: N/A;    HYSTEROSCOPY WITH DILATION AND CURETTAGE OF  "UTERUS N/A 11/16/2022    Procedure: HYSTEROSCOPY, WITH DILATION AND CURETTAGE OF UTERUS;  Surgeon: Claude Chery MD;  Location: [facility];  Service: OB/GYN;  Laterality: N/A;      Review of patient's allergies indicates:  No Known Allergies     ROS:Pertinent items are noted in HPI.    Physical exam:    BP (!) 140/90   Pulse (!) 111   Temp 97 °F (36.1 °C)   Resp 16   Ht 5' 8" (1.727 m)   Wt 99.2 kg (218 lb 12.8 oz)   LMP 11/11/2022   BMI 33.27 kg/m²      General Appearance: healthy, alert, no distress, smiling, her BMI is 33.27    Chest:           Breast exam:  Inframammary fold dermatitis bilaterally.  She has increased pigmentation over the dermatitis.           Lungs: clear to auscultation bilaterally and normal percussion bilaterally           Heart: regular rate and rhythm, S1, S2 normal, no murmur, click, rub or gallop    Abdomen:  Bowel sounds normal; abdomen is obese and soft; slight redness to the umbilical incision in appears there is no further Dermabond on the incision.  There is no signs of any purulent discharge.    Pelvic:  Vulva unremarkable; speculum exam reveals unremarkable cervix with no active vaginal bleeding; uterus is anteflexed and nontender and there is no adnexal tenderness.  No rectal was performed.    Extremity: normal, extremities warm, no clubbing, no cyanosis, no edema, non-tender; no signs of DVT on physical exam of lower extremities    Skin: normal exam        Assessment:   Problem List Items Addressed This Visit          GI    Chronic pelvic pain in female     Other Visit Diagnoses       Metrorrhagia    -  Primary    Dermatitis        In the inframammary folds    Dyspepsia        Chronic history             Plan:  Prevacid over-the-counter 50 mg b.i.d. for dyspepsia; discontinue diclofenac potassium; Tylenol; patient was advised of a mucus blood that was removed the time of surgery and that the laparoscopy revealed no endometriosis.            She was reassured and she " was advised to follow-up with the next appointment.            Recommend for the rash to use 1% hydrocortisone cream that is over-the-counter therapy twice a day and also Diflucan 100 mg daily for 10 days.            The transverse small umbilical incision was cleaned with Betadine, dried, and then Betadine was removed with alcohol solution followed by application of Dermabond with the incision dry.  She was advised to keep incision dry while showering.  In this will be rechecked at her next visit.

## 2022-11-23 NOTE — PATIENT INSTRUCTIONS
Dr. Claude Chery thanks you for this office visit at [facility].    Diagnosis for this visit:   Problem List Items Addressed This Visit          GI    Chronic pelvic pain in female     Other Visit Diagnoses       Metrorrhagia    -  Primary    Dermatitis        In the inframammary folds    Dyspepsia        Chronic history             The Plan: Prevacid over-the-counter 50 mg b.i.d. for dyspepsia; discontinue diclofenac potassium; Tylenol; patient was advised of a mucus blood that was removed the time of surgery and that the laparoscopy revealed no endometriosis.               Recommend for the rash to use 1% hydrocortisone cream that is over-the-counter therapy twice a day and also Diflucan 100 mg daily for 10 days.  For any discomfort recommend over-the-counter Tylenol such as to 500 mg tablets every 6 hours as needed for pain relief.              She was reassured and she was advised to follow-up with the next appointment.      Please practice best food and exercise habits for your age.    Dr. Claude Chery recommends avoidance of smoking and illicit medications or habits.    Please call  or schedule for any change in your health.     Please keep the next scheduled appointment or call for any need to change the appointment.     Dr. Claude Chery recommends yearly exams for good health maintenance.      Thank you    Dr. Claude Chery  11/23/2022 4:23 PM

## 2025-01-08 DIAGNOSIS — K21.9 GERD (GASTROESOPHAGEAL REFLUX DISEASE): Primary | ICD-10-CM

## 2025-03-03 ENCOUNTER — TELEPHONE (OUTPATIENT)
Dept: GASTROENTEROLOGY | Facility: CLINIC | Age: 40
End: 2025-03-03
Payer: COMMERCIAL

## 2025-03-05 ENCOUNTER — TELEPHONE (OUTPATIENT)
Dept: GASTROENTEROLOGY | Facility: CLINIC | Age: 40
End: 2025-03-05
Payer: COMMERCIAL

## 2025-03-05 ENCOUNTER — OFFICE VISIT (OUTPATIENT)
Dept: GASTROENTEROLOGY | Facility: CLINIC | Age: 40
End: 2025-03-05
Payer: COMMERCIAL

## 2025-03-05 VITALS
DIASTOLIC BLOOD PRESSURE: 91 MMHG | WEIGHT: 205 LBS | HEIGHT: 68 IN | OXYGEN SATURATION: 100 % | HEART RATE: 68 BPM | SYSTOLIC BLOOD PRESSURE: 139 MMHG | BODY MASS INDEX: 31.07 KG/M2

## 2025-03-05 DIAGNOSIS — R19.7 DIARRHEA, UNSPECIFIED TYPE: ICD-10-CM

## 2025-03-05 DIAGNOSIS — K21.9 GASTROESOPHAGEAL REFLUX DISEASE, UNSPECIFIED WHETHER ESOPHAGITIS PRESENT: Primary | ICD-10-CM

## 2025-03-05 DIAGNOSIS — R13.19 ESOPHAGEAL DYSPHAGIA: ICD-10-CM

## 2025-03-05 PROCEDURE — 1160F RVW MEDS BY RX/DR IN RCRD: CPT | Mod: CPTII,,, | Performed by: NURSE PRACTITIONER

## 2025-03-05 PROCEDURE — 1159F MED LIST DOCD IN RCRD: CPT | Mod: CPTII,,, | Performed by: NURSE PRACTITIONER

## 2025-03-05 PROCEDURE — 99999 PR PBB SHADOW E&M-EST. PATIENT-LVL IV: CPT | Mod: PBBFAC,,, | Performed by: NURSE PRACTITIONER

## 2025-03-05 PROCEDURE — 3008F BODY MASS INDEX DOCD: CPT | Mod: CPTII,,, | Performed by: NURSE PRACTITIONER

## 2025-03-05 PROCEDURE — 3080F DIAST BP >= 90 MM HG: CPT | Mod: CPTII,,, | Performed by: NURSE PRACTITIONER

## 2025-03-05 PROCEDURE — 99204 OFFICE O/P NEW MOD 45 MIN: CPT | Mod: S$PBB,,, | Performed by: NURSE PRACTITIONER

## 2025-03-05 PROCEDURE — 99214 OFFICE O/P EST MOD 30 MIN: CPT | Mod: PBBFAC | Performed by: NURSE PRACTITIONER

## 2025-03-05 PROCEDURE — 3075F SYST BP GE 130 - 139MM HG: CPT | Mod: CPTII,,, | Performed by: NURSE PRACTITIONER

## 2025-03-05 RX ORDER — DEXLANSOPRAZOLE 60 MG/1
60 CAPSULE, DELAYED RELEASE ORAL 2 TIMES DAILY
COMMUNITY
Start: 2025-02-28

## 2025-03-05 RX ORDER — OXYBUTYNIN CHLORIDE 5 MG/1
5 TABLET ORAL 2 TIMES DAILY
COMMUNITY
Start: 2025-01-06

## 2025-03-05 NOTE — PROGRESS NOTES
Theodora Murray is a 39 y.o. female here for Gastroesophageal Reflux        PCP: Kalyani Olivarez  Referring Provider: Yu Payton [facility]  [address] [facility] [id number]     HPI:  Presents for report of heartburn and reflux symptoms.  Patient has failed multiple PPIs for heartburn and reflux symptoms.  She is currently taking Dexilant 60 mg twice daily.  Reports that she has increased heartburn and reflux worse after eating.  Also reports left upper quadrant pain that increases after eating.  No history of pancreatitis.  Reports that she had an EGD about 1 year ago per Dr. Mejia in [place].  No nausea or vomiting.  No unintentional weight loss.  Patient is reporting diarrhea stools multiple times per day.  States that she averages 2-3 diarrhea stools per day.  Worse after eating.  No previous cholecystectomy.  Denies dysphagia.  No known history of celiac disease or IBD.    Gastroesophageal Reflux  She complains of abdominal pain. She reports no chest pain or no nausea. Pertinent negatives include no fatigue.         ROS:  Review of Systems   Constitutional:  Negative for appetite change, fatigue, fever and unexpected weight change.   HENT:  Negative for trouble swallowing.    Cardiovascular:  Negative for chest pain.   Gastrointestinal:  Positive for abdominal pain, diarrhea and reflux. Negative for blood in stool, change in bowel habit, constipation, nausea and vomiting.   Musculoskeletal:  Negative for gait problem.   Integumentary:  Negative for pallor.   Psychiatric/Behavioral:  The patient is not nervous/anxious.           PMHX:  has a past medical history of Chronic GERD, Depression, Generalized anxiety disorder, Hypertension, Hypokalemia, and Unspecified hemorrhoids.    PSHX:  has a past surgical history that includes Diagnostic laparoscopy (N/A, 11/16/2022) and Hysteroscopy with dilation and curettage of uterus (N/A, 11/16/2022).    PFHX: family history includes Hypertension in her  "mother; Osteoporosis in her mother.    PSlHX:  reports that she has been smoking cigarettes and vaping with nicotine. She started smoking about 2 years ago. She has a 2.6 pack-year smoking history. She has never used smokeless tobacco. She reports that she does not currently use alcohol. She reports that she does not use drugs.        Review of patient's allergies indicates:  No Known Allergies    Medication List with Changes/Refills   Current Medications    ATENOLOL (TENORMIN) 100 MG TABLET    Take 100 mg by mouth once daily.    CHOLECALCIFEROL, VITAMIN D3, 1,250 MCG (50,000 UNIT) CAPSULE    Take 50,000 Units by mouth every 7 days.    DEXLANSOPRAZOLE (DEXILANT) 60 MG CAPSULE    Take 60 mg by mouth 2 (two) times a day.    NEOMYCIN-POLYMYXIN-HYDROCORTISONE (CORTISPORIN) 3.5-10,000-1 MG/ML-UNIT/ML-% OTIC SUSPENSION    Place 3 drops into both ears 3 (three) times daily.    OXYBUTYNIN (DITROPAN) 5 MG TAB    Take 5 mg by mouth 2 (two) times daily.    POTASSIUM CHLORIDE SA (K-DUR,KLOR-CON) 20 MEQ TABLET    Take 20 mEq by mouth 3 (three) times daily.   Discontinued Medications    BUSPIRONE (BUSPAR) 10 MG TABLET    Take 10 mg by mouth 2 (two) times daily.    CETIRIZINE (ZYRTEC) 10 MG TABLET    Take 10 mg by mouth every evening.    CHLORTHALIDONE (HYGROTEN) 25 MG TAB    Take 25 mg by mouth once daily.    DULOXETINE (CYMBALTA) 30 MG CAPSULE    Take 30 mg by mouth once daily.    LANSOPRAZOLE (PREVACID SOLUTAB) 15 MG DISINTEGRATING TABLET    Take 1 tablet (15 mg total) by mouth 2 (two) times a day.    OMEPRAZOLE (PRILOSEC) 40 MG CAPSULE    Take 40 mg by mouth once daily.    RISPERIDONE (RISPERDAL) 0.5 MG TAB    Take 0.5 mg by mouth 2 (two) times daily.        Objective Findings:  Vital Signs:  BP (!) 139/91 (BP Location: Left arm, Patient Position: Sitting)   Pulse 68   Ht 5' 8" (1.727 m)   Wt 93 kg (205 lb)   SpO2 100%   BMI 31.17 kg/m²  Body mass index is 31.17 kg/m².    Physical Exam:  Physical Exam  Vitals and nursing " note reviewed.   Constitutional:       General: She is not in acute distress.     Appearance: Normal appearance.   HENT:      Mouth/Throat:      Mouth: Mucous membranes are moist.   Cardiovascular:      Rate and Rhythm: Normal rate.   Pulmonary:      Effort: Pulmonary effort is normal.   Abdominal:      General: Bowel sounds are normal. There is no distension.      Palpations: Abdomen is soft. There is no mass.      Tenderness: There is abdominal tenderness in the left upper quadrant.   Skin:     General: Skin is warm and dry.      Coloration: Skin is not jaundiced or pale.   Neurological:      Mental Status: She is alert and oriented to person, place, and time.   Psychiatric:         Mood and Affect: Mood normal.          Labs:  Lab Results   Component Value Date    WBC 10.78 11/15/2022    HGB 13.6 11/15/2022    HCT 43.8 11/15/2022    MCV 90.5 11/15/2022    RDW 14.8 (H) 11/15/2022     (H) 01/04/2023    LYMPH 15.5 (L) 11/15/2022    LYMPH 1.67 11/15/2022    MONO 7.1 (H) 11/15/2022    EOS 0.11 11/15/2022    BASO 0.06 11/15/2022     Lab Results   Component Value Date     01/03/2023    K 3.1 (L) 01/03/2023     (H) 01/03/2023    CO2 21 (L) 01/03/2023    GLU 91 11/15/2022    BUN 8 01/03/2023    CREATININE 0.69 01/03/2023    CALCIUM 8.9 01/03/2023    PROT 7.5 11/15/2022    ALBUMIN 3.9 11/15/2022    BILITOT 0.3 11/15/2022    ALKPHOS 79 11/15/2022    AST 17 11/15/2022    ALT 40 11/15/2022         Imaging: No results found.      Assessment:  Theodora Murray is a 39 y.o. female here with:  1. Gastroesophageal reflux disease, unspecified whether esophagitis present    2. Diarrhea, unspecified type    3. Esophageal dysphagia          Recommendations:  1. Abdominal ultrasound  2. EGD/Dilation  3. CBC, CMP, Celiac labs  4. Continue Dexilant  5. Stool studies  6. Consider CT abdomen and pelvis  7. Avoid spicy, greasy foods  Avoid caffeine, citric acid, chocolate, peppermint, and carbonated drinks  Do not lay down  within 3 hours of eating  Increase fluid to 64 ounces daily  Avoid antiinflammatory medications such as motrin, advil, aleve, ibuprofen, and BC powder      Follow up in about 3 months (around 6/5/2025).      Order summary:  Orders Placed This Encounter    Fecal leukocytes    Enteric Pathogen Panel    C Diff Toxin by PCR    US Abdomen Complete    Occult blood x 1, stool    Fecal fat, qualitative    Calprotectin, Stool    CBC Auto Differential    Comprehensive Metabolic Panel    IgA    Gliadin (Deamidated) Ab, Eval    Endomysial antibody, IgA titer    Tissue Transglutaminase Ab, IgA    EGD w Dilation       Thank you for allowing me to participate in the care of Theodora Murray.      Daniela Bush, RADHA

## 2025-03-05 NOTE — PATIENT INSTRUCTIONS
Pepcid Complete at lunch    Avoid spicy, greasy foods  Avoid caffeine, citric acid, chocolate, peppermint, and carbonated drinks, red based foods  Do not lay down within 3 hours of eating  Increase fluid to 64 ounces daily  Avoid antiinflammatory medications such as motrin, advil, aleve, ibuprofen, and BC powder

## 2025-03-08 LAB — OCCULT BLOOD: NEGATIVE

## 2025-03-10 ENCOUNTER — RESULTS FOLLOW-UP (OUTPATIENT)
Dept: GASTROENTEROLOGY | Facility: CLINIC | Age: 40
End: 2025-03-10

## 2025-03-10 ENCOUNTER — TELEPHONE (OUTPATIENT)
Dept: GASTROENTEROLOGY | Facility: CLINIC | Age: 40
End: 2025-03-10
Payer: COMMERCIAL

## 2025-03-10 NOTE — TELEPHONE ENCOUNTER
Results called to patient. Verbalized understanding.              ----- Message from STARR Osuna sent at 3/10/2025 11:20 AM CDT -----  No blood in stool  ----- Message -----  From: Lab, Background User  Sent: 3/8/2025   4:56 PM CDT  To: STARR Leggett

## 2025-03-13 ENCOUNTER — TELEPHONE (OUTPATIENT)
Dept: GASTROENTEROLOGY | Facility: CLINIC | Age: 40
End: 2025-03-13
Payer: COMMERCIAL

## 2025-03-13 NOTE — TELEPHONE ENCOUNTER
Attempted to call results. No answer or voicemail noted.            ----- Message from STARR Osuna sent at 3/13/2025  9:38 AM CDT -----  Calprotectin is only 0.8 above normal and not significantly elevated.  ----- Message -----  From: Lab, Background User  Sent: 3/5/2025   4:20 PM CDT  To: STARR Leggett

## 2025-03-13 NOTE — TELEPHONE ENCOUNTER
Attempted to call again since pt had tried to call me back.  No answer or voicemail noted.          ----- Message from STARR Osuna sent at 3/13/2025  9:38 AM CDT -----  Calprotectin is only 0.8 above normal and not significantly elevated.  ----- Message -----  From: Lab, Background User  Sent: 3/5/2025   4:20 PM CDT  To: STARR Leggett

## 2025-03-14 ENCOUNTER — HOSPITAL ENCOUNTER (OUTPATIENT)
Dept: RADIOLOGY | Facility: HOSPITAL | Age: 40
Discharge: HOME | End: 2025-03-14
Attending: NURSE PRACTITIONER
Payer: COMMERCIAL

## 2025-03-14 DIAGNOSIS — K21.9 GASTROESOPHAGEAL REFLUX DISEASE, UNSPECIFIED WHETHER ESOPHAGITIS PRESENT: ICD-10-CM

## 2025-03-14 PROCEDURE — 76700 US EXAM ABDOM COMPLETE: CPT | Mod: 26,,, | Performed by: RADIOLOGY

## 2025-03-14 PROCEDURE — 76700 US EXAM ABDOM COMPLETE: CPT | Mod: TC

## 2025-03-17 ENCOUNTER — TELEPHONE (OUTPATIENT)
Dept: GASTROENTEROLOGY | Facility: CLINIC | Age: 40
End: 2025-03-17
Payer: COMMERCIAL

## 2025-03-17 DIAGNOSIS — K80.20 GALLSTONES: Primary | ICD-10-CM

## 2025-03-17 NOTE — TELEPHONE ENCOUNTER
Returned call to patient to let her know that the next steps were the referral to the surgeon about the gallstones. Verbalized understanding.          ----- Message from Jean sent at 3/17/2025  1:17 PM CDT -----  Who Called: Theodora Lorenz the patient know what this is regarding?:was told she has gallstones and wants to know the next stepsPreferred Method of Contact: Phone CallPatient's Preferred Phone Number on File: [phone] Best Call Back Number, if different:Additional Information:

## 2025-03-17 NOTE — TELEPHONE ENCOUNTER
Results and recommendations called to patient. Verbalized understanding. Agreeable to surgeon referral. Also rescheduled patient for EGD from 3/19 to 4/4 due to physician illness. Verbalized understanding.          ----- Message from STARR Osuna sent at 3/17/2025  9:22 AM CDT -----  Gallstones on abdominal ultrasound. She does report some abdominal pain after eating but reported that it was primarily on the Left upper quadrant. Endorses heartburn and reflux. May offer referral   to the surgeon for evaluation.  ----- Message -----  From: Anali Rad Results In  Sent: 3/15/2025   6:20 PM CDT  To: STARR Leggett

## 2025-03-18 ENCOUNTER — TELEPHONE (OUTPATIENT)
Dept: GASTROENTEROLOGY | Facility: CLINIC | Age: 40
End: 2025-03-18
Payer: COMMERCIAL

## 2025-03-18 NOTE — TELEPHONE ENCOUNTER
Returned call to patient to let her know that we do not prescribe pain medications, and that the pain was coming from the gallstones, and that was the reason for the surgeon referral, that she may take some over the counter paint medication for now. Verbalized understanding.            ----- Message from Charley sent at 3/18/2025  3:09 PM CDT -----  Regarding: Abdominal Pain - Wanting RX  Who Called: Theodora Hobbs is wondering if she can be sent in something for her abdominal pain. Preferred Method of Contact: Phone CallPatient's Preferred Phone Number on File: [phone]

## 2025-03-18 NOTE — TELEPHONE ENCOUNTER
Returned call to patient to let her know that referral was sent to Dr. Mejia. Verbalized understanding.        \----- Message from STARR Osuna sent at 3/18/2025 10:02 AM CDT -----  This is patient preference. You can fax referral to Roberto.  ----- Message -----  From: Vlevet Guerra RN  Sent: 3/18/2025   8:52 AM CDT  To: STARR Leggett    I sent referral to Dr. Donaldson, but now patient is asking if she can be referred to Dr. Mejia in [place]. Please advise.  ----- Message -----  From: Isabela Adams  Sent: 3/18/2025   8:07 AM CDT  To: [facility] Staff    Who Called: Theodora Hammond is requesting assistance/information from provider's office.Pt is calling to speak to a nurse to see if she could be referred to Dr. Esvin Mejia in [place]. Preferred Method of Contact: Phone CallPatient's Preferred Phone Number on File: [phone] Best Call Back Number, if different:Additional Information:

## 2025-03-19 ENCOUNTER — TELEPHONE (OUTPATIENT)
Dept: GASTROENTEROLOGY | Facility: CLINIC | Age: 40
End: 2025-03-19
Payer: COMMERCIAL

## 2025-03-19 NOTE — TELEPHONE ENCOUNTER
Please see previous telephone encounters where I have called the patient back every time she has called. Returned call to patient to remind her that I did send the referral to Dr. Mejia in [place]. Verbalized understanding.            ----- Message from Teresa sent at 3/19/2025  8:49 AM CDT -----  Regarding: Patient asked to be referred to Dr. Esvin Mejia  Who Called: Theodora Hammond is requesting assistance/information from provider's office.Preferred Method of Contact: Phone CallPatient's Preferred Phone Number on File: [phone] Best Call Back Number, if different:Additional Information: Pt is calling again to speak to a nurse to see if she could be referred to Dr. Esvin Mejia in [place]. She has called several times, and no one has called her back.

## 2025-04-04 ENCOUNTER — ANESTHESIA (OUTPATIENT)
Dept: GASTROENTEROLOGY | Facility: HOSPITAL | Age: 40
End: 2025-04-04

## 2025-04-04 ENCOUNTER — ANESTHESIA EVENT (OUTPATIENT)
Dept: GASTROENTEROLOGY | Facility: HOSPITAL | Age: 40
End: 2025-04-04

## 2025-04-04 NOTE — ANESTHESIA PREPROCEDURE EVALUATION
04/04/2025  Theodora Murray is a 39 y.o., female.      Pre-op Assessment    I have reviewed the Patient Summary Reports.     I have reviewed the Nursing Notes. I have reviewed the NPO Status.   I have reviewed the Medications.     Review of Systems  Anesthesia Hx:  No problems with previous Anesthesia   History of prior surgery of interest to airway management or planning:          Denies Family Hx of Anesthesia complications.    Denies Personal Hx of Anesthesia complications.                    Cardiovascular:     Hypertension                                    Hypertension         Hepatic/GI:     GERD         Gerd          Psych:  Psychiatric History                Active Ambulatory Problems     Diagnosis Date Noted    Dysmenorrhea 11/15/2022    Menometrorrhagia 11/15/2022    Chronic pelvic pain in female 11/15/2022    Hypertension     Depression     Hypokalemia     Gastroesophageal reflux disease 03/05/2025    Diarrhea 03/05/2025    Esophageal dysphagia 03/05/2025     Resolved Ambulatory Problems     Diagnosis Date Noted    No Resolved Ambulatory Problems     Past Medical History:   Diagnosis Date    Chronic GERD     Generalized anxiety disorder     Unspecified hemorrhoids      Review of patient's allergies indicates:  No Known Allergies  Medications Ordered Prior to Encounter[1]  Past Surgical History:   Procedure Laterality Date    DIAGNOSTIC LAPAROSCOPY N/A 11/16/2022    Procedure: LAPAROSCOPY, DIAGNOSTIC;  Surgeon: Claude Chery MD;  Location: [facility] OR;  Service: OB/GYN;  Laterality: N/A;    HYSTEROSCOPY WITH DILATION AND CURETTAGE OF UTERUS N/A 11/16/2022    Procedure: HYSTEROSCOPY, WITH DILATION AND CURETTAGE OF UTERUS;  Surgeon: Claude Chery MD;  Location: [facility] OR;  Service: OB/GYN;  Laterality: N/A;         Physical Exam  General: Well nourished    Airway:  Mallampati: II   Mouth  Opening: Normal  TM Distance: Normal, at least 6 cm  Tongue: Normal  Neck ROM: Normal ROM        Anesthesia Plan  Type of Anesthesia, risks & benefits discussed:    Anesthesia Type: MAC  Intra-op Monitoring Plan: Standard ASA Monitors  Post Op Pain Control Plan: multimodal analgesia  Induction:  IV  Informed Consent: Informed consent signed with the Patient and all parties understand the risks and agree with anesthesia plan.  All questions answered. Patient consented to blood products? No  ASA Score: 3  Day of Surgery Review of History & Physical: H&P Update referred to the surgeon/provider.I have interviewed and examined the patient. I have reviewed the patient's H&P dated: There are no significant changes.     Ready For Surgery From Anesthesia Perspective.     .           [1]   Current Outpatient Medications on File Prior to Visit   Medication Sig Dispense Refill    atenoloL (TENORMIN) 100 MG tablet Take 100 mg by mouth once daily.      cholecalciferol, vitamin D3, 1,250 mcg (50,000 unit) capsule Take 50,000 Units by mouth every 7 days.      dexlansoprazole (DEXILANT) 60 mg capsule Take 60 mg by mouth 2 (two) times a day.      neomycin-polymyxin-hydrocortisone (CORTISPORIN) 3.5-10,000-1 mg/mL-unit/mL-% otic suspension Place 3 drops into both ears 3 (three) times daily. (Patient not taking: Reported on 3/5/2025) 10 mL 0    oxybutynin (DITROPAN) 5 MG Tab Take 5 mg by mouth 2 (two) times daily.      potassium chloride SA (K-DUR,KLOR-CON) 20 MEQ tablet Take 20 mEq by mouth 3 (three) times daily.       No current facility-administered medications on file prior to visit.

## 2025-04-28 ENCOUNTER — OFFICE VISIT (OUTPATIENT)
Dept: FAMILY MEDICINE | Facility: CLINIC | Age: 40
End: 2025-04-28
Payer: COMMERCIAL

## 2025-04-28 VITALS
RESPIRATION RATE: 18 BRPM | SYSTOLIC BLOOD PRESSURE: 121 MMHG | DIASTOLIC BLOOD PRESSURE: 84 MMHG | OXYGEN SATURATION: 100 % | BODY MASS INDEX: 30.92 KG/M2 | WEIGHT: 204 LBS | TEMPERATURE: 99 F | HEART RATE: 71 BPM | HEIGHT: 68 IN

## 2025-04-28 DIAGNOSIS — S93.401A SPRAIN OF RIGHT ANKLE, UNSPECIFIED LIGAMENT, INITIAL ENCOUNTER: Primary | ICD-10-CM

## 2025-04-28 PROCEDURE — 3008F BODY MASS INDEX DOCD: CPT | Mod: CPTII,,,

## 2025-04-28 PROCEDURE — 1159F MED LIST DOCD IN RCRD: CPT | Mod: CPTII,,,

## 2025-04-28 PROCEDURE — 3079F DIAST BP 80-89 MM HG: CPT | Mod: CPTII,,,

## 2025-04-28 PROCEDURE — 3074F SYST BP LT 130 MM HG: CPT | Mod: CPTII,,,

## 2025-04-28 PROCEDURE — 1160F RVW MEDS BY RX/DR IN RCRD: CPT | Mod: CPTII,,,

## 2025-04-28 PROCEDURE — 99203 OFFICE O/P NEW LOW 30 MIN: CPT | Mod: 25,,,

## 2025-04-28 PROCEDURE — 96372 THER/PROPH/DIAG INJ SC/IM: CPT | Mod: ,,,

## 2025-04-28 RX ORDER — KETOROLAC TROMETHAMINE 10 MG/1
10 TABLET, FILM COATED ORAL EVERY 6 HOURS
Qty: 20 TABLET | Refills: 0 | Status: SHIPPED | OUTPATIENT
Start: 2025-04-28 | End: 2025-05-02

## 2025-04-28 RX ORDER — KETOROLAC TROMETHAMINE 30 MG/ML
60 INJECTION, SOLUTION INTRAMUSCULAR; INTRAVENOUS
Status: COMPLETED | OUTPATIENT
Start: 2025-04-28 | End: 2025-04-28

## 2025-04-28 RX ORDER — ALBUTEROL SULFATE 90 UG/1
2 INHALANT RESPIRATORY (INHALATION) EVERY 6 HOURS PRN
COMMUNITY
Start: 2024-12-13

## 2025-04-28 RX ADMIN — KETOROLAC TROMETHAMINE 60 MG: 30 INJECTION, SOLUTION INTRAMUSCULAR; INTRAVENOUS at 09:04

## 2025-04-28 NOTE — PROGRESS NOTES
Subjective     Patient ID: Theodora Murray is a 39 y.o. female.    Chief Complaint: Shoulder Pain (Patient states she broke her right shoulder three weeks ago and is having pain. She states she saw Dr. Ocasio in [place]. ), Ankle Pain (Patient states she twisted her right ankle one day last week and is having pain. She went to the ER on 04/24/25), Medication Refill (Patient is wanting to get her Risperdal refilled. She states it has been a year since she has had this medication. ), and Health Maintenance (Lipid Panel Never done/HIV Screening Never done/TETANUS VACCINE Never done/Hemoglobin A1c (Diabetic Prevention Screening) Never done/COVID-19 Vaccine(3 - 2024-25 season) due on 09/01/2024 /Patient states she has gotten all vaccines at [facility] in [place]. )    Shoulder Pain     Ankle Pain     Medication Refill      Review of Systems       Objective     Physical Exam  Vitals and nursing note reviewed.   Constitutional:       Appearance: Normal appearance.   HENT:      Head: Normocephalic and atraumatic.   Cardiovascular:      Rate and Rhythm: Normal rate and regular rhythm.      Pulses: Normal pulses.      Heart sounds: Normal heart sounds.   Pulmonary:      Effort: Pulmonary effort is normal.      Breath sounds: Normal breath sounds.   Musculoskeletal:      Right ankle: Normal.      Left ankle: Normal.   Skin:     General: Skin is warm.      Capillary Refill: Capillary refill takes less than 2 seconds.   Neurological:      Mental Status: She is alert.            Assessment and Plan     1. Sprain of right ankle, unspecified ligament, initial encounter  -     ketorolac injection 60 mg  -     ketorolac (TORADOL) 10 mg tablet; Take 1 tablet (10 mg total) by mouth every 6 (six) hours. for 5 days  Dispense: 20 tablet; Refill: 0        Pt's shoulder fracture is being managed by ortho, was instructed to wear a sling. No abnormalities noted on exam of ankle, reviewed ED notes from ankle injury visit. Will trial  NSAIDs  Behavioral health is managed by [facility], pt is unsure of doses. If unable to obtain refills from [facility],will call the clinic with dosages for a refill until able to reestablish care with [facility]         No follow-ups on file.

## 2025-05-01 ENCOUNTER — HOSPITAL ENCOUNTER (OUTPATIENT)
Dept: RADIOLOGY | Facility: HOSPITAL | Age: 40
Discharge: HOME | End: 2025-05-01
Attending: INTERNAL MEDICINE
Payer: COMMERCIAL

## 2025-05-01 ENCOUNTER — OFFICE VISIT (OUTPATIENT)
Dept: FAMILY MEDICINE | Facility: CLINIC | Age: 40
End: 2025-05-01
Payer: COMMERCIAL

## 2025-05-01 VITALS
HEIGHT: 68 IN | DIASTOLIC BLOOD PRESSURE: 84 MMHG | OXYGEN SATURATION: 100 % | SYSTOLIC BLOOD PRESSURE: 135 MMHG | RESPIRATION RATE: 16 BRPM | BODY MASS INDEX: 31.37 KG/M2 | HEART RATE: 74 BPM | WEIGHT: 207 LBS | TEMPERATURE: 99 F

## 2025-05-01 DIAGNOSIS — K21.9 GASTROESOPHAGEAL REFLUX DISEASE, UNSPECIFIED WHETHER ESOPHAGITIS PRESENT: ICD-10-CM

## 2025-05-01 DIAGNOSIS — M25.511 ACUTE PAIN OF RIGHT SHOULDER: Primary | ICD-10-CM

## 2025-05-01 DIAGNOSIS — M25.571 ACUTE RIGHT ANKLE PAIN: ICD-10-CM

## 2025-05-01 DIAGNOSIS — F41.9 ANXIETY: ICD-10-CM

## 2025-05-01 DIAGNOSIS — I10 PRIMARY HYPERTENSION: Chronic | ICD-10-CM

## 2025-05-01 DIAGNOSIS — M25.511 ACUTE PAIN OF RIGHT SHOULDER: ICD-10-CM

## 2025-05-01 DIAGNOSIS — F32.A DEPRESSION, UNSPECIFIED DEPRESSION TYPE: Chronic | ICD-10-CM

## 2025-05-01 PROBLEM — G93.40 ENCEPHALOPATHY: Status: ACTIVE | Noted: 2023-01-01

## 2025-05-01 PROBLEM — K44.9 HIATAL HERNIA: Status: ACTIVE | Noted: 2022-12-29

## 2025-05-01 PROCEDURE — 73610 X-RAY EXAM OF ANKLE: CPT | Mod: 26,RT,, | Performed by: RADIOLOGY

## 2025-05-01 PROCEDURE — 1159F MED LIST DOCD IN RCRD: CPT | Mod: CPTII,,, | Performed by: INTERNAL MEDICINE

## 2025-05-01 PROCEDURE — 99214 OFFICE O/P EST MOD 30 MIN: CPT | Mod: ,,, | Performed by: INTERNAL MEDICINE

## 2025-05-01 PROCEDURE — 1160F RVW MEDS BY RX/DR IN RCRD: CPT | Mod: CPTII,,, | Performed by: INTERNAL MEDICINE

## 2025-05-01 PROCEDURE — 3075F SYST BP GE 130 - 139MM HG: CPT | Mod: CPTII,,, | Performed by: INTERNAL MEDICINE

## 2025-05-01 PROCEDURE — 73610 X-RAY EXAM OF ANKLE: CPT | Mod: TC,PN,RT

## 2025-05-01 PROCEDURE — 73030 X-RAY EXAM OF SHOULDER: CPT | Mod: TC,PN,RT

## 2025-05-01 PROCEDURE — 3079F DIAST BP 80-89 MM HG: CPT | Mod: CPTII,,, | Performed by: INTERNAL MEDICINE

## 2025-05-01 PROCEDURE — 73030 X-RAY EXAM OF SHOULDER: CPT | Mod: 26,RT,, | Performed by: RADIOLOGY

## 2025-05-01 PROCEDURE — 3008F BODY MASS INDEX DOCD: CPT | Mod: CPTII,,, | Performed by: INTERNAL MEDICINE

## 2025-05-01 RX ORDER — GABAPENTIN 300 MG/1
300 CAPSULE ORAL DAILY
Qty: 90 CAPSULE | Refills: 1 | Status: SHIPPED | OUTPATIENT
Start: 2025-05-01 | End: 2026-05-01

## 2025-05-01 RX ORDER — TRAMADOL HYDROCHLORIDE 50 MG/1
50 TABLET ORAL EVERY 6 HOURS PRN
COMMUNITY
Start: 2025-04-23 | End: 2025-05-02

## 2025-05-01 RX ORDER — ZINC GLUCONATE 13.3 MG
200 LOZENGE ORAL 2 TIMES DAILY PRN
COMMUNITY

## 2025-05-01 NOTE — PROGRESS NOTES
New Clinic Note    Patient Name:  Theodora Murray is a 39 y.o. female     Chief Complaint:    Chief Complaint   Patient presents with    Pain     Patient reports pain in her right shoulder and her right ankle. She injured her shoulder almost a month ago while walking her dog. She injured her ankle when she missed a step and twisted it about a week ago. She states she cannot take Toradol because of acid reflux. She's been prescribed Tramadol, also, but it hurts her stomach.     Emergency Refill     She used to see [facility] for mental health. She has an appointment with them next Thursday, but asks if Dr. Singh will refill her Risperdal. She doesn't have this bottle with her today and is unsure of the dose she takes.         Subjective  Pain  Associated symptoms include arthralgias. Pertinent negatives include no abdominal pain, chest pain, congestion, coughing, fatigue, fever, headaches, nausea, neck pain, rash, sore throat, vertigo or vomiting.          Current Medications[1]   Past Medical History:   Diagnosis Date    Abnormal uterine bleeding About 2 months ago    Chronic GERD     Depression     Generalized anxiety disorder     Hypertension     Hypokalemia     Unspecified hemorrhoids       Past Surgical History:   Procedure Laterality Date    DIAGNOSTIC LAPAROSCOPY N/A 11/16/2022    Procedure: LAPAROSCOPY, DIAGNOSTIC;  Surgeon: Claude Chery MD;  Location: [facility] OR;  Service: OB/GYN;  Laterality: N/A;    HYSTEROSCOPY WITH DILATION AND CURETTAGE OF UTERUS N/A 11/16/2022    Procedure: HYSTEROSCOPY, WITH DILATION AND CURETTAGE OF UTERUS;  Surgeon: Claude Chery MD;  Location: [facility] OR;  Service: OB/GYN;  Laterality: N/A;      Family History   Problem Relation Name Age of Onset    Hypertension Mother Tanja Swathi Brown     Osteoporosis Mother Tanja Barvelma Brown     Osteoarthritis Mother Tanja Swathi Brown     Diabetes Father Sergey Brown     Asthma Brother Jude Atkinson     Migraines Sister Jennifer  "Bere Brown       Social History[2]     Review of Systems   Constitutional:  Negative for fatigue and fever.   HENT:  Negative for nasal congestion, rhinorrhea and sore throat.    Eyes:  Negative for visual disturbance.   Respiratory:  Negative for cough, chest tightness, shortness of breath and wheezing.    Cardiovascular:  Negative for chest pain, palpitations and leg swelling.   Gastrointestinal:  Negative for abdominal pain, blood in stool, nausea and vomiting.   Genitourinary:  Negative for dysuria and hematuria.   Musculoskeletal:  Positive for arthralgias. Negative for back pain and neck pain.   Integumentary:  Negative for rash and mole/lesion.   Neurological:  Negative for dizziness, vertigo, headaches and memory loss.   Hematological:  Negative for adenopathy.   Psychiatric/Behavioral:  Negative for confusion. The patient is not nervous/anxious.         Objective:  /84 (BP Location: Left arm, Patient Position: Sitting)   Pulse 74   Temp 99.3 °F (37.4 °C) (Oral)   Resp 16   Ht 5' 8" (1.727 m)   Wt 93.9 kg (207 lb)   SpO2 100%   BMI 31.47 kg/m²      Physical Exam  Constitutional:       Appearance: Normal appearance.   HENT:      Head: Normocephalic and atraumatic.      Right Ear: External ear normal.      Left Ear: External ear normal.      Nose: Nose normal.   Eyes:      Extraocular Movements: Extraocular movements intact.      Conjunctiva/sclera: Conjunctivae normal.      Pupils: Pupils are equal, round, and reactive to light.   Cardiovascular:      Rate and Rhythm: Normal rate and regular rhythm.      Pulses: Normal pulses.      Heart sounds: Normal heart sounds. No murmur heard.     No friction rub. No gallop.   Pulmonary:      Effort: Pulmonary effort is normal.      Breath sounds: No wheezing, rhonchi or rales.   Abdominal:      General: Bowel sounds are normal.      Palpations: There is no mass.      Tenderness: There is no abdominal tenderness.   Musculoskeletal:         General: No " swelling.      Cervical back: Normal range of motion and neck supple.      Comments: Pain in lateral aspect of right shoulder with movement   Skin:     General: Skin is warm.      Findings: No lesion or rash.   Neurological:      General: No focal deficit present.      Mental Status: She is alert and oriented to person, place, and time.   Psychiatric:         Mood and Affect: Mood normal.          Assessment and Plan    Acute pain of right shoulder  -     X-ray Shoulder 2 or More Views Right; Future; Expected date: 05/01/2025  -     gabapentin (NEURONTIN) 300 MG capsule; Take 1 capsule (300 mg total) by mouth once daily.  Dispense: 90 capsule; Refill: 1    Acute right ankle pain  -     X-Ray Ankle Complete 3 View Right; Future; Expected date: 05/01/2025    Primary hypertension    Gastroesophageal reflux disease, unspecified whether esophagitis present    Depression, unspecified depression type    Anxiety         Problem List Items Addressed This Visit       Hypertension (Chronic)    Depression (Chronic)    Gastroesophageal reflux disease    Anxiety     Other Visit Diagnoses         Acute pain of right shoulder    -  Primary    Relevant Medications    gabapentin (NEURONTIN) 300 MG capsule    Other Relevant Orders    X-ray Shoulder 2 or More Views Right      Acute right ankle pain        Relevant Orders    X-Ray Ankle Complete 3 View Right         1-Right shoulder pain -there is a non-displace fracture on the lateral head of the humerus.  After getting back from xray she states she has an appt with Dr. Ocasio (ortho) for this.  She already saw him within the last month.  She states she doesn't want to drive to [place] today.  Will use neurontin 300mg qd prn for now. Will still need to f/u with ortho.  The ankle xray was normal  2-HTN controlled  3-Anxiety/Depression-goes to [place] next week.  Will not restart Risperidone today ( I saw in a note from 2022 that she was on 10mg bid.)      Follow up in about 3 months  (around 2025).        [1]   Current Outpatient Medications:     atenoloL (TENORMIN) 100 MG tablet, Take 100 mg by mouth once daily., Disp: , Rfl:     cimetidine (TAGAMET) 200 MG tablet, Take 200 mg by mouth 2 (two) times daily as needed., Disp: , Rfl:     dexlansoprazole (DEXILANT) 60 mg capsule, Take 60 mg by mouth once daily., Disp: , Rfl:     ketorolac (TORADOL) 10 mg tablet, Take 1 tablet (10 mg total) by mouth every 6 (six) hours. for 5 days, Disp: 20 tablet, Rfl: 0    oxybutynin (DITROPAN) 5 MG Tab, Take 5 mg by mouth 2 (two) times daily., Disp: , Rfl:     potassium chloride SA (K-DUR,KLOR-CON) 20 MEQ tablet, Take 20 mEq by mouth 3 (three) times daily., Disp: , Rfl:     traMADoL (ULTRAM) 50 mg tablet, Take 50 mg by mouth every 6 (six) hours as needed., Disp: , Rfl:     albuterol (PROVENTIL/VENTOLIN HFA) 90 mcg/actuation inhaler, Inhale 2 puffs into the lungs every 6 (six) hours as needed for Wheezing., Disp: , Rfl:     gabapentin (NEURONTIN) 300 MG capsule, Take 1 capsule (300 mg total) by mouth once daily., Disp: 90 capsule, Rfl: 1  [2]   Social History  Tobacco Use    Smoking status: Former     Current packs/day: 0.00     Average packs/day: 1 pack/day for 0.5 years (0.5 ttl pk-yrs)     Types: Vaping with nicotine, Cigarettes     Start date: 2022     Quit date:      Years since quittin.3     Passive exposure: Never    Smokeless tobacco: Never   Substance Use Topics    Alcohol use: Not Currently    Drug use: Never

## 2025-05-01 NOTE — LETTER
AUTHORIZATION FOR RELEASE OF   CONFIDENTIAL INFORMATION    Dear Dr. Ocasio,    We are seeing Theodora Murray, date of birth 1985, in the clinic at [facility]. Jose Singh MD is the patient's PCP. Theodora Murray has an outstanding lab/procedure at the time we reviewed her chart. In order to help keep her health information updated, she has authorized us to request the following medical record(s):        (  )  MAMMOGRAM                                      (  )  COLONOSCOPY      (  )  PAP SMEAR                                          (  )  OUTSIDE LAB RESULTS     (  )  DEXA SCAN                                          (  )  EYE EXAM            (  )  FOOT EXAM                                          ( X )  ENTIRE RECORD     (  )  OUTSIDE IMMUNIZATIONS                 (  )  _______________         Please fax records to [facility], Eakes, Patrick H, MD, [phone]     If you have any questions, please contact [facility] at [phone].           Patient Name: Theodora Murray  : 1985  Patient Phone #: [phone]

## 2025-05-01 NOTE — PROGRESS NOTES
I spoke with the pharmacist at [facility] in [place] to verify respiradol dose. She stated that patient had not been seen there since 2022, and had not had that medication filled since 12/2022.  The supervisor at [facility], Nicki, confirmed this, but stated that she did recently have her evaluation with a therapist, and is scheduled to follow up with the NP next Thursday for medication therapy.

## 2025-05-02 ENCOUNTER — OFFICE VISIT (OUTPATIENT)
Dept: FAMILY MEDICINE | Facility: CLINIC | Age: 40
End: 2025-05-02
Payer: COMMERCIAL

## 2025-05-02 VITALS
HEIGHT: 68 IN | TEMPERATURE: 98 F | DIASTOLIC BLOOD PRESSURE: 83 MMHG | WEIGHT: 208.63 LBS | RESPIRATION RATE: 20 BRPM | BODY MASS INDEX: 31.62 KG/M2 | OXYGEN SATURATION: 100 % | HEART RATE: 83 BPM | SYSTOLIC BLOOD PRESSURE: 124 MMHG

## 2025-05-02 DIAGNOSIS — M25.511 RIGHT SHOULDER PAIN, UNSPECIFIED CHRONICITY: ICD-10-CM

## 2025-05-02 DIAGNOSIS — M25.571 ACUTE RIGHT ANKLE PAIN: Primary | ICD-10-CM

## 2025-05-02 RX ORDER — MELOXICAM 15 MG/1
15 TABLET ORAL DAILY
Qty: 30 TABLET | Refills: 0 | Status: SHIPPED | OUTPATIENT
Start: 2025-05-02

## 2025-05-02 NOTE — PROGRESS NOTES
[facility]    Svetlana Abel AGPCNP-BC  [facility]  [address] [place] [address]  [phone]          PATIENT NAME: Theodora Murray  : 1985  DATE: 25  MRN: [id number]          Reason for Visit        Chief Complaint   Patient presents with    Shoulder Pain     Right shoulder pain   Broke shoulder about 4 weeks ago   Can not drive or use right shoulder to complete ADL's      Ankle Pain     Right ankle pain present x 1 week; from a sprain, with c/o continued pain.        History of Present Illness      History of Present Illness    CHIEF COMPLAINT:  Patient presents today for right shoulder and ankle pain    MUSCULOSKELETAL INJURIES:  She sustained a right shoulder injury 4 weeks ago while walking a dog when she had to suddenly pull on the leash due to another dog approaching. She reports being told her shoulder was broken and rates the pain as 4-6/10. She has a scheduled follow-up with Dr. Claude Oacsio Jr next week. She injured her ankle one week ago after missing a step, rating the pain as 7-8/10.     PAIN MANAGEMENT:  She manages pain with Tylenol 2 tablets every 4 hours and Ibuprofen. She reports previous Toradol treatment was effective for pain management. She was instructed to wear compression wrap but does not have this on today. Discussed the need to rest, ice, compress, and elevated the ankle; which is the appropriate treatment for sprain. She has been seen at two different emergency departments and by two other providers to same or similar complaints with the latest being on yesterday; 2025. Discuss trying something like mobic or celebrex. She opted to try Meloxicam and was encouraged to keep appointment with orthopedics next week as scheduled.     MEDICATIONS:  She did not bring any medication bottles in with her today.       ROS:  General: -fever, -chills, -fatigue, -weight gain, -weight loss  Eyes:  +vision changes, -redness, -discharge  ENT: -ear pain, -nasal congestion, -sore throat  Cardiovascular: -chest pain, -palpitations, -lower extremity edema  Respiratory: -cough, -shortness of breath  Gastrointestinal: +abdominal pain, -nausea, -vomiting, +diarrhea, -constipation, -blood in stool, +indigestion  Genitourinary: -dysuria, -hematuria, -frequency  Musculoskeletal: -joint pain, -muscle pain, +pain with movement  Skin: -rash, -lesion  Neurological: -headache, -dizziness, -numbness, -tingling  Psychiatric: -anxiety, -depression, -sleep difficulty          MEDICAL / SURGICAL / SOCIAL HISTORY     Past Medical History:   Diagnosis Date    Abnormal uterine bleeding About 2 months ago    Chronic GERD     Depression     Generalized anxiety disorder     Hypertension     Hypokalemia     Unspecified hemorrhoids        Past Surgical History:   Procedure Laterality Date    DIAGNOSTIC LAPAROSCOPY N/A 2022    Procedure: LAPAROSCOPY, DIAGNOSTIC;  Surgeon: Claude Chery MD;  Location: [facility];  Service: OB/GYN;  Laterality: N/A;    HYSTEROSCOPY WITH DILATION AND CURETTAGE OF UTERUS N/A 2022    Procedure: HYSTEROSCOPY, WITH DILATION AND CURETTAGE OF UTERUS;  Surgeon: Claude Chery MD;  Location: [facility];  Service: OB/GYN;  Laterality: N/A;       Social History     Tobacco Use    Smoking status: Former     Current packs/day: 0.00     Average packs/day: 1 pack/day for 0.5 years (0.5 ttl pk-yrs)     Types: Vaping with nicotine, Cigarettes     Start date: 2022     Quit date:      Years since quittin.3     Passive exposure: Never    Smokeless tobacco: Never   Substance Use Topics    Alcohol use: Not Currently    Drug use: Never         I personally reviewed all past medical, surgical, and social.     MEDICATIONS / ALLERGIES / HM     Current Outpatient Medications   Medication Sig Dispense Refill    albuterol (PROVENTIL/VENTOLIN HFA) 90 mcg/actuation inhaler Inhale 2 puffs into the lungs  "every 6 (six) hours as needed for Wheezing.      atenoloL (TENORMIN) 100 MG tablet Take 100 mg by mouth once daily.      cimetidine (TAGAMET) 200 MG tablet Take 200 mg by mouth 2 (two) times daily as needed.      dexlansoprazole (DEXILANT) 60 mg capsule Take 60 mg by mouth once daily.      gabapentin (NEURONTIN) 300 MG capsule Take 1 capsule (300 mg total) by mouth once daily. 90 capsule 1    oxybutynin (DITROPAN) 5 MG Tab Take 5 mg by mouth 2 (two) times daily.      potassium chloride SA (K-DUR,KLOR-CON) 20 MEQ tablet Take 20 mEq by mouth 3 (three) times daily.      meloxicam (MOBIC) 15 MG tablet Take 1 tablet (15 mg total) by mouth once daily. 30 tablet 0     No current facility-administered medications for this visit.       Review of patient's allergies indicates:  No Known Allergies    Immunization History   Administered Date(s) Administered    COVID-19, MRNA, LN-S, PF (Pfizer) (Purple Cap) 02/01/2021, 02/22/2021        Health Maintenance   Topic Date Due    Lipid Panel  Never done    HIV Screening  Never done    TETANUS VACCINE  Never done    Hemoglobin A1c (Diabetic Prevention Screening)  Never done    COVID-19 Vaccine (3 - 2024-25 season) 09/01/2024    Cervical Cancer Screening  09/22/2025    RSV Vaccine (Age 60+ and Pregnant patients) (1 - 1-dose 75+ series) 08/14/2061    Hepatitis C Screening  Completed    Influenza Vaccine  Completed    Pneumococcal Vaccines (Age 0-49)  Aged Out        Physical Exam      Vital Signs  Temp: 98.2 °F (36.8 °C)  Pulse: 83  Resp: 20  SpO2: 100 %  BP: 124/83  Pain Score: 0-No pain  Height and Weight  Height: 5' 8" (172.7 cm)  Weight: 94.6 kg (208 lb 9.6 oz)  BSA (Calculated - sq m): 2.13 sq meters  BMI (Calculated): 31.7  Weight in (lb) to have BMI = 25: 164.1]    Physical Exam  Constitutional:       General: She is not in acute distress.     Appearance: She is well-groomed. She is obese.   HENT:      Head: Normocephalic.      Right Ear: External ear normal.      Left Ear: " "External ear normal.   Cardiovascular:      Rate and Rhythm: Normal rate and regular rhythm.      Pulses: Normal pulses.      Heart sounds: Normal heart sounds.   Pulmonary:      Effort: Pulmonary effort is normal.      Breath sounds: Normal breath sounds.   Abdominal:      Palpations: Abdomen is soft.      Tenderness: There is no abdominal tenderness.   Musculoskeletal:      Right shoulder: Decreased range of motion (c/o of pain with limited movement.).      Right ankle: No swelling or deformity. Normal range of motion. Normal pulse.   Skin:     General: Skin is warm and dry.   Neurological:      Mental Status: She is alert and oriented to person, place, and time.   Psychiatric:         Mood and Affect: Mood normal.         Behavior: Behavior normal. Behavior is cooperative.       Laboratory:    Lab Results   Component Value Date    GLU 84 03/05/2025     03/05/2025     01/03/2023    K 3.7 03/05/2025    K 3.1 (L) 01/03/2023     03/05/2025     (H) 01/03/2023    CO2 27 03/05/2025    CO2 21 (L) 01/03/2023    BUN 8 03/05/2025    BUN 8 01/03/2023    CREATININE 0.68 03/05/2025    CREATININE 0.69 01/03/2023    CALCIUM 9.7 03/05/2025    CALCIUM 8.9 01/03/2023    PROT 7.3 03/05/2025    ALBUMIN 4.4 03/05/2025    BILITOT 0.6 03/05/2025    ALKPHOS 72 03/05/2025    AST 15 03/05/2025    ALT 13 03/05/2025    ANIONGAP 12 03/05/2025    ANIONGAP 11 01/03/2023    ESTGFRAFRICA 116 01/03/2023       Lab Results   Component Value Date    WBC 8.97 03/05/2025    RBC 5.03 03/05/2025    HGB 15.0 03/05/2025    HCT 46.2 03/05/2025    MCV 91.8 03/05/2025    RDW 12.1 03/05/2025     03/05/2025     (H) 01/04/2023        No results found for: "CHOL", "TRIG", "HDL", "LDLCALC", "TOTALCHOLEST"    Lab Results   Component Value Date    TSH 0.577 01/04/2023       No results found for: "HGBA1C", "ESTIMATEDAVG"     No results found for: "JABMSTNW86"    Lab Results   Component Value Date    HLLEJMWN49JK 35.6 09/22/2022 "     Point Of Care Testing:    Nitrites, UA   Date Value Ref Range Status   11/15/2022 Negative Negative Final     Urobilinogen, UA   Date Value Ref Range Status   11/15/2022 Normal 0.2, 1.0, Normal mg/dL Final     pH, UA   Date Value Ref Range Status   11/15/2022 7.0 5.0 to 8.0 pH Units Final     Specific Gravity, UA   Date Value Ref Range Status   11/15/2022 1.021 <=1.030 Final     Ketones, UA   Date Value Ref Range Status   11/15/2022 Negative Negative mg/dL Final       Lab Results   Component Value Date    FJB34YBWCMBC Negative 01/31/2023       Assessment/Plan     Acute right ankle pain  -     meloxicam (MOBIC) 15 MG tablet; Take 1 tablet (15 mg total) by mouth once daily.  Dispense: 30 tablet; Refill: 0    Right shoulder pain, unspecified chronicity  -     meloxicam (MOBIC) 15 MG tablet; Take 1 tablet (15 mg total) by mouth once daily.  Dispense: 30 tablet; Refill: 0        Assessment & Plan      IMPRESSION:  - Assessed right shoulder injury from 4 weeks ago and ankle injury from 1 week ago.  - Limited range of motion in shoulder, pain at 4-6/10.  - Ankle pain rated at 7-8/10, observed moving ankle freely in her flip flops during office visit.   - Considered conservative management with OTC pain medications and/or topical treatments.  - Instructed to follow up with Dr. Ocasio next week as scheduled.   - Reviewed RICE method (Rest, Ice, Compression, Elevation) for management.  - Advised to maintain elevation, avoid twisting/turning the injured ankle, and continue using ace bandage for compression and stability.    FOLLOW UP:  - Follow up with PCP as scheduled and/or sooner as needed.        Future Appointments   Date Time Provider Department Center   6/9/2025  1:00 PM Daniela Bush FNP [facility]   7/31/2025  8:45 AM Jose Singh MD [facility]   11/13/2025  8:45 AM Jose Singh MD [facility]       Workup results were reviewed and all questions were answered.  Diagnosis and treatment options were discussed and the patient  is amenable with the overall treatment plan. Verbal and written discharge instructions were given including to return to clinic/ED with any acute worsening of symptoms or failure of symptoms to improve. The reasons for return to the clinic/ED were explained in lay terms. No further intervention is warranted at this time. The patient agrees with the plan, expresses understanding, is hemodynamically stable and in no acute distress.     All questions answered to desired level of satisfaction    This note was generated with the assistance of ambient listening technology. Verbal consent was obtained by the patient and accompanying visitor(s) for the recording of patient appointment to facilitate this note. I attest to having reviewed and edited the generated note for accuracy, though some syntax or spelling errors may persist. Please contact the author of this note for any clarification.         SEAN Jamison-BC [facility]

## 2025-05-05 ENCOUNTER — TELEPHONE (OUTPATIENT)
Dept: FAMILY MEDICINE | Facility: CLINIC | Age: 40
End: 2025-05-05
Payer: COMMERCIAL

## 2025-05-05 NOTE — TELEPHONE ENCOUNTER
Patient requesting pain medication for her shoulder.  Informed patient that she would have to follow up with Dr. Ocasio her ortho specialist for pain medication.  Voiced understanding.

## 2025-05-05 NOTE — TELEPHONE ENCOUNTER
----- Message from Kaela sent at 5/5/2025  9:06 AM CDT -----  LUCIO OLSON [[id number]] is asking for a substitute for her meloxicam (MOBIC) 15 MG tablet

## 2025-05-06 ENCOUNTER — OFFICE VISIT (OUTPATIENT)
Dept: FAMILY MEDICINE | Facility: CLINIC | Age: 40
End: 2025-05-06
Payer: COMMERCIAL

## 2025-05-06 VITALS
BODY MASS INDEX: 31.07 KG/M2 | HEIGHT: 68 IN | HEART RATE: 78 BPM | DIASTOLIC BLOOD PRESSURE: 78 MMHG | SYSTOLIC BLOOD PRESSURE: 122 MMHG | WEIGHT: 205 LBS | TEMPERATURE: 98 F | OXYGEN SATURATION: 100 % | RESPIRATION RATE: 18 BRPM

## 2025-05-06 DIAGNOSIS — R52 PAIN: ICD-10-CM

## 2025-05-06 DIAGNOSIS — J30.9 ALLERGIC RHINITIS, UNSPECIFIED SEASONALITY, UNSPECIFIED TRIGGER: Primary | ICD-10-CM

## 2025-05-06 PROCEDURE — 1159F MED LIST DOCD IN RCRD: CPT | Mod: CPTII,,,

## 2025-05-06 PROCEDURE — 3074F SYST BP LT 130 MM HG: CPT | Mod: CPTII,,,

## 2025-05-06 PROCEDURE — 3008F BODY MASS INDEX DOCD: CPT | Mod: CPTII,,,

## 2025-05-06 PROCEDURE — 99213 OFFICE O/P EST LOW 20 MIN: CPT | Mod: 25,,,

## 2025-05-06 PROCEDURE — 3078F DIAST BP <80 MM HG: CPT | Mod: CPTII,,,

## 2025-05-06 PROCEDURE — 96372 THER/PROPH/DIAG INJ SC/IM: CPT | Mod: ,,,

## 2025-05-06 PROCEDURE — 1160F RVW MEDS BY RX/DR IN RCRD: CPT | Mod: CPTII,,,

## 2025-05-06 RX ORDER — KETOROLAC TROMETHAMINE 30 MG/ML
60 INJECTION, SOLUTION INTRAMUSCULAR; INTRAVENOUS
Status: COMPLETED | OUTPATIENT
Start: 2025-05-06 | End: 2025-05-06

## 2025-05-06 RX ORDER — FLUTICASONE PROPIONATE 50 MCG
2 SPRAY, SUSPENSION (ML) NASAL DAILY
Qty: 11.1 ML | Refills: 0 | Status: SHIPPED | OUTPATIENT
Start: 2025-05-06

## 2025-05-06 RX ORDER — DEXAMETHASONE SODIUM PHOSPHATE 4 MG/ML
4 INJECTION, SOLUTION INTRA-ARTICULAR; INTRALESIONAL; INTRAMUSCULAR; INTRAVENOUS; SOFT TISSUE
Status: COMPLETED | OUTPATIENT
Start: 2025-05-06 | End: 2025-05-06

## 2025-05-06 RX ORDER — ASPIRIN 325 MG
50000 TABLET, DELAYED RELEASE (ENTERIC COATED) ORAL
COMMUNITY
Start: 2025-05-02

## 2025-05-06 RX ADMIN — DEXAMETHASONE SODIUM PHOSPHATE 4 MG: 4 INJECTION, SOLUTION INTRA-ARTICULAR; INTRALESIONAL; INTRAMUSCULAR; INTRAVENOUS; SOFT TISSUE at 10:05

## 2025-05-06 RX ADMIN — KETOROLAC TROMETHAMINE 60 MG: 30 INJECTION, SOLUTION INTRAMUSCULAR; INTRAVENOUS at 10:05

## 2025-05-06 NOTE — PROGRESS NOTES
Subjective     Patient ID: Theodora Murray is a 39 y.o. female.    Chief Complaint: Cough (Few days, patient states she feels like her bronchitis is acting up ), Shoulder Pain (Still having right shoulder pain ), and Ankle Pain (Still having right ankle pain )    Cough    Shoulder Pain     Ankle Pain       Review of Systems   Respiratory:  Positive for cough.           Objective     Physical Exam  Vitals and nursing note reviewed.   Constitutional:       Appearance: Normal appearance.   HENT:      Head: Normocephalic.      Right Ear: Tympanic membrane, ear canal and external ear normal.      Left Ear: Tympanic membrane, ear canal and external ear normal.      Nose: Congestion present.      Mouth/Throat:      Mouth: Mucous membranes are moist.      Pharynx: Oropharynx is clear.   Eyes:      Pupils: Pupils are equal, round, and reactive to light.   Cardiovascular:      Rate and Rhythm: Normal rate and regular rhythm.      Pulses: Normal pulses.      Heart sounds: Normal heart sounds.   Pulmonary:      Effort: Pulmonary effort is normal.      Breath sounds: Normal breath sounds.   Musculoskeletal:         General: No swelling.      Right ankle: Normal.      Left ankle: Normal.   Skin:     General: Skin is warm.      Capillary Refill: Capillary refill takes less than 2 seconds.   Neurological:      General: No focal deficit present.      Mental Status: She is alert.   Psychiatric:         Mood and Affect: Mood normal.            Assessment and Plan     1. Allergic rhinitis, unspecified seasonality, unspecified trigger  -     dexAMETHasone injection 4 mg  -     fluticasone propionate (FLONASE) 50 mcg/actuation nasal spray; 2 sprays (100 mcg total) by Each Nostril route once daily.  Dispense: 11.1 mL; Refill: 0    2. Pain  -     ketorolac injection 60 mg    Pt is scheduled to f/u with ortho tomorrow. Pt states Toradol has helped relieve pain.          Follow up in about 3 months (around 8/6/2025).

## 2025-05-13 ENCOUNTER — TELEPHONE (OUTPATIENT)
Dept: FAMILY MEDICINE | Facility: CLINIC | Age: 40
End: 2025-05-13
Payer: COMMERCIAL

## 2025-05-13 DIAGNOSIS — K21.9 GASTROESOPHAGEAL REFLUX DISEASE, UNSPECIFIED WHETHER ESOPHAGITIS PRESENT: Primary | ICD-10-CM

## 2025-05-13 RX ORDER — DEXLANSOPRAZOLE 60 MG/1
60 CAPSULE, DELAYED RELEASE ORAL DAILY
Qty: 30 CAPSULE | Refills: 2 | Status: SHIPPED | OUTPATIENT
Start: 2025-05-13

## 2025-05-13 NOTE — TELEPHONE ENCOUNTER
----- Message from Ailyn sent at 5/13/2025  9:43 AM CDT -----  Pt called and stated that she needs a refill on dexlansoprazole sent to [facility] good call back is [phone]

## 2025-05-14 RX ORDER — ALBUTEROL SULFATE 90 UG/1
2 INHALANT RESPIRATORY (INHALATION) EVERY 6 HOURS PRN
Qty: 18 G | Refills: 1 | Status: SHIPPED | OUTPATIENT
Start: 2025-05-14

## 2025-05-27 ENCOUNTER — OFFICE VISIT (OUTPATIENT)
Dept: FAMILY MEDICINE | Facility: CLINIC | Age: 40
End: 2025-05-27
Payer: COMMERCIAL

## 2025-05-27 ENCOUNTER — HOSPITAL ENCOUNTER (OUTPATIENT)
Dept: RADIOLOGY | Facility: HOSPITAL | Age: 40
Discharge: HOME | End: 2025-05-27
Payer: COMMERCIAL

## 2025-05-27 VITALS
HEIGHT: 68 IN | SYSTOLIC BLOOD PRESSURE: 121 MMHG | BODY MASS INDEX: 31.37 KG/M2 | DIASTOLIC BLOOD PRESSURE: 78 MMHG | OXYGEN SATURATION: 97 % | TEMPERATURE: 99 F | HEART RATE: 78 BPM | WEIGHT: 207 LBS | RESPIRATION RATE: 18 BRPM

## 2025-05-27 DIAGNOSIS — R05.9 COUGH, UNSPECIFIED TYPE: ICD-10-CM

## 2025-05-27 DIAGNOSIS — M25.511 ACUTE PAIN OF RIGHT SHOULDER: ICD-10-CM

## 2025-05-27 DIAGNOSIS — M25.511 ACUTE PAIN OF RIGHT SHOULDER: Primary | ICD-10-CM

## 2025-05-27 PROCEDURE — 3008F BODY MASS INDEX DOCD: CPT | Mod: CPTII,,,

## 2025-05-27 PROCEDURE — 96372 THER/PROPH/DIAG INJ SC/IM: CPT | Mod: ,,,

## 2025-05-27 PROCEDURE — 73030 X-RAY EXAM OF SHOULDER: CPT | Mod: 26,RT,, | Performed by: RADIOLOGY

## 2025-05-27 PROCEDURE — 1159F MED LIST DOCD IN RCRD: CPT | Mod: CPTII,,,

## 2025-05-27 PROCEDURE — 3074F SYST BP LT 130 MM HG: CPT | Mod: CPTII,,,

## 2025-05-27 PROCEDURE — 99213 OFFICE O/P EST LOW 20 MIN: CPT | Mod: 25,,,

## 2025-05-27 PROCEDURE — 73030 X-RAY EXAM OF SHOULDER: CPT | Mod: TC,PN,RT

## 2025-05-27 PROCEDURE — 1160F RVW MEDS BY RX/DR IN RCRD: CPT | Mod: CPTII,,,

## 2025-05-27 PROCEDURE — 3078F DIAST BP <80 MM HG: CPT | Mod: CPTII,,,

## 2025-05-27 RX ORDER — KETOROLAC TROMETHAMINE 10 MG/1
10 TABLET, FILM COATED ORAL EVERY 6 HOURS
Qty: 20 TABLET | Refills: 0 | Status: SHIPPED | OUTPATIENT
Start: 2025-05-27 | End: 2025-06-01

## 2025-05-27 RX ORDER — KETOROLAC TROMETHAMINE 30 MG/ML
60 INJECTION, SOLUTION INTRAMUSCULAR; INTRAVENOUS
Status: COMPLETED | OUTPATIENT
Start: 2025-05-27 | End: 2025-05-27

## 2025-05-27 RX ORDER — LEVOFLOXACIN 750 MG/1
750 TABLET, FILM COATED ORAL
COMMUNITY
Start: 2025-05-22

## 2025-05-27 RX ADMIN — KETOROLAC TROMETHAMINE 60 MG: 30 INJECTION, SOLUTION INTRAMUSCULAR; INTRAVENOUS at 03:05

## 2025-05-27 NOTE — LETTER
May 30, 2025      [facility]  [address] [place] [address]  Phone: [phone]  Fax: [phone]       Patient: Theodora Murray   YOB: 1985  Date of Visit: 05/27/2025    To Whom It May Concern:    JESÚS Murray  was at [facility] on 05/27/2025. The patient may return to work/school on 05/28/2025 with no restrictions. If you have any questions or concerns, or if I can be of further assistance, please do not hesitate to contact me.    Sincerely,    Magui Ngo RN

## 2025-05-28 ENCOUNTER — RESULTS FOLLOW-UP (OUTPATIENT)
Dept: FAMILY MEDICINE | Facility: CLINIC | Age: 40
End: 2025-05-28
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
## 2025-05-29 RX ORDER — BENZONATATE 200 MG/1
200 CAPSULE ORAL 3 TIMES DAILY PRN
Qty: 30 CAPSULE | Refills: 0 | Status: SHIPPED | OUTPATIENT
Start: 2025-05-29 | End: 2025-06-08

## 2025-05-30 ENCOUNTER — OFFICE VISIT (OUTPATIENT)
Dept: FAMILY MEDICINE | Facility: CLINIC | Age: 40
End: 2025-05-30
Payer: COMMERCIAL

## 2025-05-30 VITALS
SYSTOLIC BLOOD PRESSURE: 126 MMHG | DIASTOLIC BLOOD PRESSURE: 79 MMHG | RESPIRATION RATE: 18 BRPM | TEMPERATURE: 98 F | OXYGEN SATURATION: 97 % | HEART RATE: 82 BPM | BODY MASS INDEX: 32.13 KG/M2 | HEIGHT: 68 IN | WEIGHT: 212 LBS

## 2025-05-30 DIAGNOSIS — M25.562 ACUTE PAIN OF BOTH KNEES: Primary | ICD-10-CM

## 2025-05-30 DIAGNOSIS — M54.50 ACUTE BILATERAL LOW BACK PAIN WITHOUT SCIATICA: ICD-10-CM

## 2025-05-30 DIAGNOSIS — M25.561 ACUTE PAIN OF BOTH KNEES: Primary | ICD-10-CM

## 2025-05-30 RX ORDER — TRAMADOL HYDROCHLORIDE 50 MG/1
50 TABLET, FILM COATED ORAL EVERY 6 HOURS PRN
Qty: 30 TABLET | Refills: 2 | Status: SHIPPED | OUTPATIENT
Start: 2025-05-30

## 2025-05-30 NOTE — PROGRESS NOTES
New Clinic Note    Patient Name:  Theodora Murray is a 39 y.o. female     Chief Complaint:    Chief Complaint   Patient presents with    Knee Pain     She states she had a fall on Sunday. She complains of bilateral knee and lower back pain. 7/10 pain level. She received a Toradol shot and some Toradol pills.     Back Pain     Lower back pain. 5/10 pain level.         Subjective  Knee Pain     Back Pain  Pertinent negatives include no abdominal pain, chest pain, dysuria, fever or headaches.          Current Medications[1]   Past Medical History:   Diagnosis Date    Abnormal uterine bleeding About 2 months ago    Chronic GERD     Depression     Generalized anxiety disorder     Hypertension     Hypokalemia     Unspecified hemorrhoids       Past Surgical History:   Procedure Laterality Date    DIAGNOSTIC LAPAROSCOPY N/A 11/16/2022    Procedure: LAPAROSCOPY, DIAGNOSTIC;  Surgeon: Claude Chery MD;  Location: [facility];  Service: OB/GYN;  Laterality: N/A;    HYSTEROSCOPY WITH DILATION AND CURETTAGE OF UTERUS N/A 11/16/2022    Procedure: HYSTEROSCOPY, WITH DILATION AND CURETTAGE OF UTERUS;  Surgeon: Claude Chery MD;  Location: [facility];  Service: OB/GYN;  Laterality: N/A;      Family History   Problem Relation Name Age of Onset    Hypertension Mother Tanja Brown     Osteoporosis Mother Tanja Brown     Osteoarthritis Mother Tanja Brown     Diabetes Father Sergey Brown     Asthma Brother Jude Atkinson     Migraines Sister Jennifer Brown       Social History[2]     Review of Systems   Constitutional:  Negative for fatigue and fever.   HENT:  Negative for nasal congestion, rhinorrhea and sore throat.    Eyes:  Negative for visual disturbance.   Respiratory:  Negative for cough, chest tightness, shortness of breath and wheezing.    Cardiovascular:  Negative for chest pain, palpitations and leg swelling.   Gastrointestinal:  Negative for abdominal pain, blood in stool, nausea and  "vomiting.   Genitourinary:  Negative for dysuria and hematuria.   Musculoskeletal:  Positive for arthralgias and back pain. Negative for neck pain.   Integumentary:  Negative for rash and mole/lesion.   Neurological:  Negative for dizziness, vertigo, headaches and memory loss.   Hematological:  Negative for adenopathy.   Psychiatric/Behavioral:  Negative for confusion. The patient is not nervous/anxious.         Objective:  /79 (BP Location: Left arm, Patient Position: Sitting)   Pulse 82   Temp 98.2 °F (36.8 °C) (Oral)   Resp 18   Ht 5' 8" (1.727 m)   Wt 96.2 kg (212 lb)   LMP 04/29/2025   SpO2 97%   BMI 32.23 kg/m²      Physical Exam  Constitutional:       Appearance: Normal appearance.   HENT:      Head: Normocephalic and atraumatic.      Nose: Nose normal.   Eyes:      Extraocular Movements: Extraocular movements intact.      Pupils: Pupils are equal, round, and reactive to light.   Cardiovascular:      Rate and Rhythm: Normal rate.      Pulses: Normal pulses.   Pulmonary:      Effort: Pulmonary effort is normal.   Musculoskeletal:         General: Normal range of motion.      Cervical back: Normal range of motion.      Right lower leg: No edema.      Left lower leg: No edema.      Comments: Pain is actually below each knee   Neurological:      Mental Status: She is alert and oriented to person, place, and time.          Assessment and Plan    Acute pain of both knees  -     traMADoL (ULTRAM) 50 mg tablet; Take 1 tablet (50 mg total) by mouth every 6 (six) hours as needed for Pain.  Dispense: 30 tablet; Refill: 2    Acute bilateral low back pain without sciatica  -     traMADoL (ULTRAM) 50 mg tablet; Take 1 tablet (50 mg total) by mouth every 6 (six) hours as needed for Pain.  Dispense: 30 tablet; Refill: 2         Problem List Items Addressed This Visit    None  Visit Diagnoses         Acute pain of both knees    -  Primary    Relevant Medications    traMADoL (ULTRAM) 50 mg tablet      Acute " bilateral low back pain without sciatica        Relevant Medications    traMADoL (ULTRAM) 50 mg tablet           1-Back pain and bilateral knee pain-I'll give a few tramadol for the acute pain and she needs to start the PT for her shoulder that she states Dr. Ocasio ordered.  Follow up if symptoms worsen or fail to improve.          [1]   Current Outpatient Medications:     albuterol (PROVENTIL/VENTOLIN HFA) 90 mcg/actuation inhaler, Inhale 2 puffs into the lungs every 6 (six) hours as needed for Wheezing., Disp: 18 g, Rfl: 1    atenoloL (TENORMIN) 100 MG tablet, Take 100 mg by mouth once daily., Disp: , Rfl:     benzonatate (TESSALON) 200 MG capsule, Take 1 capsule (200 mg total) by mouth 3 (three) times daily as needed for Cough., Disp: 30 capsule, Rfl: 0    cholecalciferol, vitamin D3, 1,250 mcg (50,000 unit) capsule, Take 50,000 Units by mouth every 7 days., Disp: , Rfl:     cimetidine (TAGAMET) 200 MG tablet, Take 200 mg by mouth 2 (two) times daily as needed., Disp: , Rfl:     dexlansoprazole (DEXILANT) 60 mg capsule, Take 1 capsule (60 mg total) by mouth once daily., Disp: 30 capsule, Rfl: 2    fluticasone propionate (FLONASE) 50 mcg/actuation nasal spray, 2 sprays (100 mcg total) by Each Nostril route once daily., Disp: 11.1 mL, Rfl: 0    gabapentin (NEURONTIN) 300 MG capsule, Take 1 capsule (300 mg total) by mouth once daily., Disp: 90 capsule, Rfl: 1    ketorolac (TORADOL) 10 mg tablet, Take 1 tablet (10 mg total) by mouth every 6 (six) hours. for 5 days, Disp: 20 tablet, Rfl: 0    levoFLOXacin (LEVAQUIN) 750 MG tablet, Take 750 mg by mouth., Disp: , Rfl:     oxybutynin (DITROPAN) 5 MG Tab, Take 5 mg by mouth 2 (two) times daily., Disp: , Rfl:     potassium chloride SA (K-DUR,KLOR-CON) 20 MEQ tablet, Take 20 mEq by mouth 3 (three) times daily., Disp: , Rfl:     meloxicam (MOBIC) 15 MG tablet, Take 1 tablet (15 mg total) by mouth once daily. (Patient not taking: Reported on 5/30/2025), Disp: 30 tablet, Rfl:  0    traMADoL (ULTRAM) 50 mg tablet, Take 1 tablet (50 mg total) by mouth every 6 (six) hours as needed for Pain., Disp: 30 tablet, Rfl: 2  [2]   Social History  Tobacco Use    Smoking status: Former     Current packs/day: 0.00     Average packs/day: 1 pack/day for 0.5 years (0.5 ttl pk-yrs)     Types: Vaping with nicotine, Cigarettes     Start date: 2022     Quit date:      Years since quittin.4     Passive exposure: Never    Smokeless tobacco: Never   Substance Use Topics    Alcohol use: Not Currently    Drug use: Never

## 2025-05-30 NOTE — LETTER
May 30, 2025      [facility]  [address] [place] [address]  Phone: [phone]  Fax: [phone]       Patient: Theodora Murray   YOB: 1985  Date of Visit: 05/30/2025    To Whom It May Concern:    JESÚS Murray  was at [facility] on 05/30/2025. The patient may return to work/school on 05/31/2025 with no restrictions. If you have any questions or concerns, or if I can be of further assistance, please do not hesitate to contact me.    Sincerely,    Magui Ngo RN

## 2025-06-13 ENCOUNTER — TELEPHONE (OUTPATIENT)
Dept: FAMILY MEDICINE | Facility: CLINIC | Age: 40
End: 2025-06-13
Payer: COMMERCIAL
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
## 2025-06-17 ENCOUNTER — OFFICE VISIT (OUTPATIENT)
Dept: FAMILY MEDICINE | Facility: CLINIC | Age: 40
End: 2025-06-17
Payer: COMMERCIAL

## 2025-06-17 VITALS
TEMPERATURE: 99 F | RESPIRATION RATE: 18 BRPM | HEIGHT: 68 IN | WEIGHT: 215 LBS | OXYGEN SATURATION: 98 % | DIASTOLIC BLOOD PRESSURE: 88 MMHG | BODY MASS INDEX: 32.58 KG/M2 | SYSTOLIC BLOOD PRESSURE: 133 MMHG | HEART RATE: 82 BPM

## 2025-06-17 DIAGNOSIS — R52 PAIN: ICD-10-CM

## 2025-06-17 DIAGNOSIS — Z79.1 NSAID LONG-TERM USE: Primary | ICD-10-CM

## 2025-06-17 DIAGNOSIS — K21.9 GASTROESOPHAGEAL REFLUX DISEASE, UNSPECIFIED WHETHER ESOPHAGITIS PRESENT: ICD-10-CM

## 2025-06-17 LAB
ALBUMIN SERPL BCP-MCNC: 4.1 G/DL (ref 3.5–5)
ANION GAP SERPL CALCULATED.3IONS-SCNC: 12 MMOL/L (ref 7–16)
BUN SERPL-MCNC: 15 MG/DL (ref 7–19)
BUN/CREAT SERPL: 21 (ref 6–20)
CALCIUM SERPL-MCNC: 9.3 MG/DL (ref 8.4–10.2)
CHLORIDE SERPL-SCNC: 106 MMOL/L (ref 98–107)
CO2 SERPL-SCNC: 25 MMOL/L (ref 22–29)
CREAT SERPL-MCNC: 0.73 MG/DL (ref 0.55–1.02)
EGFR (NO RACE VARIABLE) (RUSH/TITUS): 108 ML/MIN/1.73M2
GLUCOSE SERPL-MCNC: 68 MG/DL (ref 74–100)
PHOSPHATE SERPL-MCNC: 4.1 MG/DL (ref 2.3–4.7)
POTASSIUM SERPL-SCNC: 3.8 MMOL/L (ref 3.5–5.1)
SODIUM SERPL-SCNC: 139 MMOL/L (ref 136–145)

## 2025-06-17 PROCEDURE — 80069 RENAL FUNCTION PANEL: CPT | Mod: ,,, | Performed by: CLINICAL MEDICAL LABORATORY

## 2025-06-17 PROCEDURE — 96372 THER/PROPH/DIAG INJ SC/IM: CPT | Mod: ,,,

## 2025-06-17 PROCEDURE — 3075F SYST BP GE 130 - 139MM HG: CPT | Mod: CPTII,,,

## 2025-06-17 PROCEDURE — 1160F RVW MEDS BY RX/DR IN RCRD: CPT | Mod: CPTII,,,

## 2025-06-17 PROCEDURE — 3008F BODY MASS INDEX DOCD: CPT | Mod: CPTII,,,

## 2025-06-17 PROCEDURE — 1159F MED LIST DOCD IN RCRD: CPT | Mod: CPTII,,,

## 2025-06-17 PROCEDURE — 99213 OFFICE O/P EST LOW 20 MIN: CPT | Mod: 25,,,

## 2025-06-17 PROCEDURE — 3079F DIAST BP 80-89 MM HG: CPT | Mod: CPTII,,,

## 2025-06-17 RX ORDER — KETOROLAC TROMETHAMINE 30 MG/ML
60 INJECTION, SOLUTION INTRAMUSCULAR; INTRAVENOUS
Status: COMPLETED | OUTPATIENT
Start: 2025-06-17 | End: 2025-06-17

## 2025-06-17 RX ORDER — OMEPRAZOLE 20 MG/1
20 CAPSULE, DELAYED RELEASE ORAL DAILY
Qty: 90 CAPSULE | Refills: 3 | Status: SHIPPED | OUTPATIENT
Start: 2025-06-17 | End: 2026-06-17

## 2025-06-17 RX ORDER — NAPROXEN SODIUM 550 MG/1
550 TABLET ORAL 2 TIMES DAILY PRN
COMMUNITY
Start: 2025-06-11

## 2025-06-17 RX ORDER — ESCITALOPRAM OXALATE 10 MG/1
10 TABLET, FILM COATED ORAL EVERY MORNING
COMMUNITY
Start: 2025-06-10

## 2025-06-17 RX ORDER — KETOROLAC TROMETHAMINE 10 MG/1
10 TABLET, FILM COATED ORAL EVERY 6 HOURS
Qty: 20 TABLET | Refills: 0 | Status: SHIPPED | OUTPATIENT
Start: 2025-06-17 | End: 2025-06-22

## 2025-06-17 RX ADMIN — KETOROLAC TROMETHAMINE 60 MG: 30 INJECTION, SOLUTION INTRAMUSCULAR; INTRAVENOUS at 04:06

## 2025-06-17 NOTE — LETTER
AUTHORIZATION FOR RELEASE OF   CONFIDENTIAL INFORMATION    Dear Silva Read ,    We are seeing Theodora Murray, date of birth 1985, in the clinic at [facility]. Gurjit Valdez FNP is the patient's PCP. Theodora Murary has an outstanding lab/procedure at the time we reviewed her chart. In order to help keep her health information updated, she has authorized us to request the following medical record(s):        (  )  MAMMOGRAM                                      (  )  COLONOSCOPY      (  )  PAP SMEAR                                          (  )  OUTSIDE LAB RESULTS     (  )  DEXA SCAN                                          (  )  EYE EXAM            (  )  FOOT EXAM                                          (  )  ENTIRE RECORD     (  )  OUTSIDE IMMUNIZATIONS                 ( x ) ER Visit on 2025         Please fax records to Gurjit Valdez FNP, [phone]     If you have any questions, please contact Nedra Mills LPN at [phone].           Patient Name: Theodora Murray  : 1985  Patient Phone #: [phone]

## 2025-06-18 ENCOUNTER — RESULTS FOLLOW-UP (OUTPATIENT)
Dept: FAMILY MEDICINE | Facility: CLINIC | Age: 40
End: 2025-06-18
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
## 2025-06-21 ENCOUNTER — TELEPHONE (OUTPATIENT)
Dept: FAMILY MEDICINE | Facility: CLINIC | Age: 40
End: 2025-06-21
Payer: COMMERCIAL

## 2025-06-21 NOTE — LETTER
AUTHORIZATION FOR RELEASE OF   CONFIDENTIAL INFORMATION    Dear Silva Read,    We are seeing Theodora Murray, date of birth 1985, in the clinic at [facility]. Gurjit Valdez FNP is the patient's PCP. Theodora Murray has an outstanding lab/procedure at the time we reviewed her chart. In order to help keep her health information updated, she has authorized us to request the following medical record(s):        (  )  MAMMOGRAM                                      (  )  COLONOSCOPY      (  )  PAP SMEAR                                          (  )  OUTSIDE LAB RESULTS     (  )  DEXA SCAN                                          (  )  EYE EXAM            (  )  FOOT EXAM                                          (  )  ENTIRE RECORD     (  )  OUTSIDE IMMUNIZATIONS                 (X)  LAST ER VISIT         Please fax records to Gurjit Valdez FNP, [phone]     If you have any questions, please contact Rama BAKER at [phone].           Patient Name: Theodora Murray  : 1985  Patient Phone #: [phone]

## 2025-06-23 ENCOUNTER — OFFICE VISIT (OUTPATIENT)
Dept: FAMILY MEDICINE | Facility: CLINIC | Age: 40
End: 2025-06-23
Payer: COMMERCIAL

## 2025-06-23 VITALS
RESPIRATION RATE: 18 BRPM | DIASTOLIC BLOOD PRESSURE: 77 MMHG | TEMPERATURE: 98 F | BODY MASS INDEX: 32.43 KG/M2 | HEART RATE: 70 BPM | SYSTOLIC BLOOD PRESSURE: 123 MMHG | OXYGEN SATURATION: 99 % | HEIGHT: 68 IN | WEIGHT: 214 LBS

## 2025-06-23 DIAGNOSIS — I10 PRIMARY HYPERTENSION: Primary | ICD-10-CM

## 2025-06-23 LAB
ANION GAP SERPL CALCULATED.3IONS-SCNC: 15 MMOL/L (ref 7–16)
BASOPHILS # BLD AUTO: 0.07 K/UL (ref 0–0.2)
BASOPHILS NFR BLD AUTO: 0.8 % (ref 0–1)
BUN SERPL-MCNC: 15 MG/DL (ref 7–19)
BUN/CREAT SERPL: 21 (ref 6–20)
CALCIUM SERPL-MCNC: 10.3 MG/DL (ref 8.4–10.2)
CHLORIDE SERPL-SCNC: 105 MMOL/L (ref 98–107)
CHOLEST SERPL-MCNC: 180 MG/DL
CHOLEST/HDLC SERPL: 5.3 {RATIO}
CO2 SERPL-SCNC: 24 MMOL/L (ref 22–29)
CREAT SERPL-MCNC: 0.7 MG/DL (ref 0.55–1.02)
DIFFERENTIAL METHOD BLD: ABNORMAL
EGFR (NO RACE VARIABLE) (RUSH/TITUS): 114 ML/MIN/1.73M2
EOSINOPHIL # BLD AUTO: 0.28 K/UL (ref 0–0.5)
EOSINOPHIL NFR BLD AUTO: 3.3 % (ref 1–4)
ERYTHROCYTE [DISTWIDTH] IN BLOOD BY AUTOMATED COUNT: 12.4 % (ref 11.5–14.5)
GLUCOSE SERPL-MCNC: 75 MG/DL (ref 74–100)
HCT VFR BLD AUTO: 40.9 % (ref 38–47)
HDLC SERPL-MCNC: 34 MG/DL (ref 35–60)
HGB BLD-MCNC: 13.1 G/DL (ref 12–16)
IMM GRANULOCYTES # BLD AUTO: 0.05 K/UL (ref 0–0.04)
IMM GRANULOCYTES NFR BLD: 0.6 % (ref 0–0.4)
LDLC SERPL CALC-MCNC: 103 MG/DL
LDLC/HDLC SERPL: 3 {RATIO}
LYMPHOCYTES # BLD AUTO: 1.98 K/UL (ref 1–4.8)
LYMPHOCYTES NFR BLD AUTO: 23.2 % (ref 27–41)
MCH RBC QN AUTO: 30 PG (ref 27–31)
MCHC RBC AUTO-ENTMCNC: 32 G/DL (ref 32–36)
MCV RBC AUTO: 93.6 FL (ref 80–96)
MONOCYTES # BLD AUTO: 0.57 K/UL (ref 0–0.8)
MONOCYTES NFR BLD AUTO: 6.7 % (ref 2–6)
MPC BLD CALC-MCNC: 10.4 FL (ref 9.4–12.4)
NEUTROPHILS # BLD AUTO: 5.57 K/UL (ref 1.8–7.7)
NEUTROPHILS NFR BLD AUTO: 65.4 % (ref 53–65)
NONHDLC SERPL-MCNC: 146 MG/DL
NRBC # BLD AUTO: 0 X10E3/UL
NRBC, AUTO (.00): 0 %
PLATELET # BLD AUTO: 331 K/UL (ref 150–400)
POTASSIUM SERPL-SCNC: 3.8 MMOL/L (ref 3.5–5.1)
RBC # BLD AUTO: 4.37 M/UL (ref 4.2–5.4)
SODIUM SERPL-SCNC: 140 MMOL/L (ref 136–145)
TRIGL SERPL-MCNC: 214 MG/DL (ref 37–140)
VLDLC SERPL-MCNC: 43 MG/DL
WBC # BLD AUTO: 8.52 K/UL (ref 4.5–11)

## 2025-06-23 PROCEDURE — 99213 OFFICE O/P EST LOW 20 MIN: CPT | Mod: ,,,

## 2025-06-23 PROCEDURE — 3078F DIAST BP <80 MM HG: CPT | Mod: CPTII,,,

## 2025-06-23 PROCEDURE — 85025 COMPLETE CBC W/AUTO DIFF WBC: CPT | Mod: ,,, | Performed by: CLINICAL MEDICAL LABORATORY

## 2025-06-23 PROCEDURE — 80048 BASIC METABOLIC PNL TOTAL CA: CPT | Mod: ,,, | Performed by: CLINICAL MEDICAL LABORATORY

## 2025-06-23 PROCEDURE — 80061 LIPID PANEL: CPT | Mod: ,,, | Performed by: CLINICAL MEDICAL LABORATORY

## 2025-06-23 PROCEDURE — 82043 UR ALBUMIN QUANTITATIVE: CPT | Mod: ,,, | Performed by: CLINICAL MEDICAL LABORATORY

## 2025-06-23 PROCEDURE — 1159F MED LIST DOCD IN RCRD: CPT | Mod: CPTII,,,

## 2025-06-23 PROCEDURE — 3008F BODY MASS INDEX DOCD: CPT | Mod: CPTII,,,

## 2025-06-23 PROCEDURE — 82570 ASSAY OF URINE CREATININE: CPT | Mod: ,,, | Performed by: CLINICAL MEDICAL LABORATORY

## 2025-06-23 PROCEDURE — 3074F SYST BP LT 130 MM HG: CPT | Mod: CPTII,,,

## 2025-06-23 PROCEDURE — 1160F RVW MEDS BY RX/DR IN RCRD: CPT | Mod: CPTII,,,

## 2025-06-24 ENCOUNTER — RESULTS FOLLOW-UP (OUTPATIENT)
Dept: FAMILY MEDICINE | Facility: CLINIC | Age: 40
End: 2025-06-24

## 2025-06-24 NOTE — PROGRESS NOTES
Subjective     Patient ID: Theodora Murray is a 39 y.o. female.    Chief Complaint: Hypertension (Patient is here today for a 1 month follow up. ) and Hand Pain (Patient states she fell last week on her left hand and right shoulder. Her hand is bruised. )    Hypertension    Hand Pain       Review of Systems       Objective     Physical Exam  Vitals and nursing note reviewed.   Constitutional:       Appearance: Normal appearance.   HENT:      Head: Normocephalic and atraumatic.      Right Ear: Tympanic membrane, ear canal and external ear normal.      Left Ear: Tympanic membrane, ear canal and external ear normal.      Nose: Nose normal.      Mouth/Throat:      Mouth: Mucous membranes are moist.      Pharynx: Oropharynx is clear.   Eyes:      Pupils: Pupils are equal, round, and reactive to light.   Cardiovascular:      Rate and Rhythm: Normal rate and regular rhythm.      Pulses: Normal pulses.      Heart sounds: Normal heart sounds.   Pulmonary:      Effort: Pulmonary effort is normal.      Breath sounds: Normal breath sounds.   Abdominal:      General: Bowel sounds are normal.      Palpations: Abdomen is soft.   Musculoskeletal:         General: No swelling.      Cervical back: Normal range of motion.   Skin:     General: Skin is warm.      Capillary Refill: Capillary refill takes less than 2 seconds.   Neurological:      General: No focal deficit present.      Mental Status: She is alert.   Psychiatric:         Mood and Affect: Mood normal.            Assessment and Plan     1. Primary hypertension  -     Basic Metabolic Panel; Future; Expected date: 06/23/2025  -     CBC Auto Differential; Future; Expected date: 06/23/2025  -     Lipid Panel; Future; Expected date: 06/23/2025  -     Microalbumin/Creatinine Ratio, Urine; Future; Expected date: 06/23/2025        BP is well controlled, continue current regimen. Hand pain is controlled on toradol, will defer imaging at this time         Follow up in about 6 months  (around 12/23/2025).

## 2025-06-25 ENCOUNTER — OFFICE VISIT (OUTPATIENT)
Dept: FAMILY MEDICINE | Facility: CLINIC | Age: 40
End: 2025-06-25
Payer: COMMERCIAL

## 2025-06-25 VITALS
TEMPERATURE: 98 F | OXYGEN SATURATION: 96 % | BODY MASS INDEX: 32.74 KG/M2 | SYSTOLIC BLOOD PRESSURE: 132 MMHG | DIASTOLIC BLOOD PRESSURE: 87 MMHG | RESPIRATION RATE: 18 BRPM | HEIGHT: 68 IN | HEART RATE: 91 BPM | WEIGHT: 216 LBS

## 2025-06-25 DIAGNOSIS — R52 PAIN: Primary | ICD-10-CM

## 2025-06-25 DIAGNOSIS — R06.02 SOB (SHORTNESS OF BREATH): ICD-10-CM

## 2025-06-25 LAB
CREAT UR-MCNC: 38 MG/DL (ref 15–325)
MICROALBUMIN UR-MCNC: <0.5 MG/DL
MICROALBUMIN/CREAT RATIO PNL UR: NORMAL

## 2025-06-25 PROCEDURE — 3008F BODY MASS INDEX DOCD: CPT | Mod: CPTII,,,

## 2025-06-25 PROCEDURE — 3075F SYST BP GE 130 - 139MM HG: CPT | Mod: CPTII,,,

## 2025-06-25 PROCEDURE — 3079F DIAST BP 80-89 MM HG: CPT | Mod: CPTII,,,

## 2025-06-25 PROCEDURE — 1160F RVW MEDS BY RX/DR IN RCRD: CPT | Mod: CPTII,,,

## 2025-06-25 PROCEDURE — 1159F MED LIST DOCD IN RCRD: CPT | Mod: CPTII,,,

## 2025-06-25 PROCEDURE — 99213 OFFICE O/P EST LOW 20 MIN: CPT | Mod: 25,,,

## 2025-06-25 PROCEDURE — 96372 THER/PROPH/DIAG INJ SC/IM: CPT | Mod: ,,,

## 2025-06-25 RX ORDER — KETOROLAC TROMETHAMINE 30 MG/ML
60 INJECTION, SOLUTION INTRAMUSCULAR; INTRAVENOUS
Status: COMPLETED | OUTPATIENT
Start: 2025-06-25 | End: 2025-06-25

## 2025-06-25 RX ORDER — ALBUTEROL SULFATE 90 UG/1
2 INHALANT RESPIRATORY (INHALATION) EVERY 6 HOURS PRN
Qty: 18 G | Refills: 1 | Status: SHIPPED | OUTPATIENT
Start: 2025-06-25

## 2025-06-25 RX ORDER — KETOROLAC TROMETHAMINE 10 MG/1
10 TABLET, FILM COATED ORAL EVERY 6 HOURS
COMMUNITY
Start: 2025-06-19

## 2025-06-25 RX ADMIN — KETOROLAC TROMETHAMINE 60 MG: 30 INJECTION, SOLUTION INTRAMUSCULAR; INTRAVENOUS at 03:06

## 2025-06-25 NOTE — LETTER
AUTHORIZATION FOR RELEASE OF   CONFIDENTIAL INFORMATION    Dear Silva Read,    We are seeing Theodora Murray, date of birth 1985, in the clinic at [facility]. Gurjit Valdez FNP is the patient's PCP. Theodora Murray has an outstanding lab/procedure at the time we reviewed her chart. In order to help keep her health information updated, she has authorized us to request the following medical record(s):        (  )  MAMMOGRAM                                      (  )  COLONOSCOPY      (  )  PAP SMEAR                                          (  )  OUTSIDE LAB RESULTS     (  )  DEXA SCAN                                          (  )  EYE EXAM            (  )  FOOT EXAM                                          (  )  ENTIRE RECORD     (  )  OUTSIDE IMMUNIZATIONS                 (X) LAST ER VISIT         Please fax records to Gurjit Valdez FNP, [phone]     If you have any questions, please contact Rama at [phone].           Patient Name: Theodora Murray  : 1985  Patient Phone #: [phone]
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
## 2025-07-01 ENCOUNTER — HOSPITAL ENCOUNTER (OUTPATIENT)
Dept: RADIOLOGY | Facility: HOSPITAL | Age: 40
Discharge: HOME | End: 2025-07-01
Payer: COMMERCIAL

## 2025-07-01 ENCOUNTER — OFFICE VISIT (OUTPATIENT)
Dept: FAMILY MEDICINE | Facility: CLINIC | Age: 40
End: 2025-07-01
Payer: COMMERCIAL

## 2025-07-01 ENCOUNTER — RESULTS FOLLOW-UP (OUTPATIENT)
Dept: FAMILY MEDICINE | Facility: CLINIC | Age: 40
End: 2025-07-01

## 2025-07-01 VITALS
RESPIRATION RATE: 18 BRPM | DIASTOLIC BLOOD PRESSURE: 75 MMHG | BODY MASS INDEX: 33.04 KG/M2 | HEART RATE: 71 BPM | HEIGHT: 68 IN | SYSTOLIC BLOOD PRESSURE: 112 MMHG | WEIGHT: 218 LBS | TEMPERATURE: 97 F | OXYGEN SATURATION: 96 %

## 2025-07-01 DIAGNOSIS — M54.2 NECK PAIN: Primary | ICD-10-CM

## 2025-07-01 DIAGNOSIS — M25.512 ACUTE PAIN OF LEFT SHOULDER: ICD-10-CM

## 2025-07-01 DIAGNOSIS — M54.2 NECK PAIN: ICD-10-CM

## 2025-07-01 PROCEDURE — 72040 X-RAY EXAM NECK SPINE 2-3 VW: CPT | Mod: 26,,, | Performed by: RADIOLOGY

## 2025-07-01 PROCEDURE — 73030 X-RAY EXAM OF SHOULDER: CPT | Mod: TC,PN,LT

## 2025-07-01 PROCEDURE — 73030 X-RAY EXAM OF SHOULDER: CPT | Mod: 26,LT,, | Performed by: RADIOLOGY

## 2025-07-01 PROCEDURE — 72040 X-RAY EXAM NECK SPINE 2-3 VW: CPT | Mod: TC,PN

## 2025-07-01 RX ORDER — DICLOFENAC SODIUM 10 MG/G
2 GEL TOPICAL 4 TIMES DAILY
Qty: 50 G | Refills: 1 | Status: SHIPPED | OUTPATIENT
Start: 2025-07-01

## 2025-07-01 RX ORDER — KETOROLAC TROMETHAMINE 30 MG/ML
60 INJECTION, SOLUTION INTRAMUSCULAR; INTRAVENOUS
Status: COMPLETED | OUTPATIENT
Start: 2025-07-01 | End: 2025-07-01

## 2025-07-01 RX ADMIN — KETOROLAC TROMETHAMINE 60 MG: 30 INJECTION, SOLUTION INTRAMUSCULAR; INTRAVENOUS at 09:07

## 2025-07-02 NOTE — PROGRESS NOTES
Subjective     Patient ID: Theodora Murray is a 39 y.o. female.    Chief Complaint: Neck Pain and Shoulder Pain (Patient states she is here today for neck pain and left shoulder pain. Patient states it has been hurting since Monday. )    Neck Pain     Shoulder Pain       Review of Systems   Musculoskeletal:  Positive for neck pain.          Objective     Physical Exam  Vitals and nursing note reviewed.   Constitutional:       Appearance: Normal appearance.   HENT:      Head: Normocephalic.      Right Ear: Tympanic membrane, ear canal and external ear normal.      Left Ear: Tympanic membrane, ear canal and external ear normal.      Nose: Nose normal.      Mouth/Throat:      Mouth: Mucous membranes are moist.      Pharynx: Oropharynx is clear.   Eyes:      Pupils: Pupils are equal, round, and reactive to light.   Cardiovascular:      Rate and Rhythm: Normal rate and regular rhythm.      Pulses: Normal pulses.      Heart sounds: Normal heart sounds.   Pulmonary:      Effort: Pulmonary effort is normal.      Breath sounds: Normal breath sounds.   Musculoskeletal:         General: No swelling.      Left shoulder: Tenderness present.      Cervical back: Normal range of motion. Tenderness present.        Back:    Skin:     General: Skin is warm.      Capillary Refill: Capillary refill takes less than 2 seconds.   Neurological:      General: No focal deficit present.      Mental Status: She is alert.   Psychiatric:         Mood and Affect: Mood normal.            Assessment and Plan     1. Neck pain  -     X-Ray Cervical Spine AP And Lateral; Future; Expected date: 07/01/2025  -     ketorolac injection 60 mg  -     diclofenac sodium (VOLTAREN ARTHRITIS PAIN) 1 % Gel; Apply 2 g topically 4 (four) times daily. Apply to neck and left shoulder as needed for pain  Dispense: 50 g; Refill: 1    2. Acute pain of left shoulder  -     X-Ray Shoulder 2 or More Views Left; Future; Expected date: 07/01/2025  -     ketorolac injection 60  mg  -     diclofenac sodium (VOLTAREN ARTHRITIS PAIN) 1 % Gel; Apply 2 g topically 4 (four) times daily. Apply to neck and left shoulder as needed for pain  Dispense: 50 g; Refill: 1        No fractures noted on Imaging, will follow up with radiologists interpretation         No follow-ups on file.

## 2025-07-10 ENCOUNTER — OFFICE VISIT (OUTPATIENT)
Dept: FAMILY MEDICINE | Facility: CLINIC | Age: 40
End: 2025-07-10
Payer: COMMERCIAL

## 2025-07-10 VITALS
TEMPERATURE: 98 F | HEART RATE: 67 BPM | RESPIRATION RATE: 18 BRPM | SYSTOLIC BLOOD PRESSURE: 120 MMHG | OXYGEN SATURATION: 99 % | BODY MASS INDEX: 31.98 KG/M2 | DIASTOLIC BLOOD PRESSURE: 87 MMHG | WEIGHT: 211 LBS | HEIGHT: 68 IN

## 2025-07-10 DIAGNOSIS — G89.29 OTHER CHRONIC PAIN: ICD-10-CM

## 2025-07-10 DIAGNOSIS — J01.40 ACUTE NON-RECURRENT PANSINUSITIS: Primary | ICD-10-CM

## 2025-07-10 PROCEDURE — 96372 THER/PROPH/DIAG INJ SC/IM: CPT | Mod: ,,,

## 2025-07-10 PROCEDURE — 1160F RVW MEDS BY RX/DR IN RCRD: CPT | Mod: CPTII,,,

## 2025-07-10 PROCEDURE — 99213 OFFICE O/P EST LOW 20 MIN: CPT | Mod: 25,,,

## 2025-07-10 PROCEDURE — 3074F SYST BP LT 130 MM HG: CPT | Mod: CPTII,,,

## 2025-07-10 PROCEDURE — 1159F MED LIST DOCD IN RCRD: CPT | Mod: CPTII,,,

## 2025-07-10 PROCEDURE — 3008F BODY MASS INDEX DOCD: CPT | Mod: CPTII,,,

## 2025-07-10 PROCEDURE — 3079F DIAST BP 80-89 MM HG: CPT | Mod: CPTII,,,

## 2025-07-10 RX ORDER — BENZONATATE 200 MG/1
200 CAPSULE ORAL 3 TIMES DAILY PRN
Qty: 30 CAPSULE | Refills: 0 | Status: SHIPPED | OUTPATIENT
Start: 2025-07-10 | End: 2025-07-20

## 2025-07-10 RX ORDER — FLUTICASONE PROPIONATE 50 MCG
2 SPRAY, SUSPENSION (ML) NASAL DAILY
Qty: 11.1 ML | Refills: 0 | Status: SHIPPED | OUTPATIENT
Start: 2025-07-10

## 2025-07-10 RX ORDER — AZITHROMYCIN 250 MG/1
TABLET, FILM COATED ORAL
Qty: 6 TABLET | Refills: 0 | Status: SHIPPED | OUTPATIENT
Start: 2025-07-10

## 2025-07-10 RX ORDER — MELOXICAM 15 MG/1
15 TABLET ORAL DAILY
Qty: 90 TABLET | Refills: 2 | Status: SHIPPED | OUTPATIENT
Start: 2025-07-10

## 2025-07-10 RX ORDER — CEFTRIAXONE 1 G/1
1 INJECTION, POWDER, FOR SOLUTION INTRAMUSCULAR; INTRAVENOUS
Status: COMPLETED | OUTPATIENT
Start: 2025-07-10 | End: 2025-07-10

## 2025-07-10 RX ADMIN — CEFTRIAXONE 1 G: 1 INJECTION, POWDER, FOR SOLUTION INTRAMUSCULAR; INTRAVENOUS at 09:07

## 2025-07-10 NOTE — LETTER
July 10, 2025      [facility]  [address] [place] [address]  Phone: [phone]  Fax: [phone]       Patient: Theodora Murray   YOB: 1985  Date of Visit: 07/10/2025    To Whom It May Concern:    JESÚS Murray  was at [facility] on 07/10/2025. The patient may return to work/school on 07/14/2025 with no restrictions. If you have any questions or concerns, or if I can be of further assistance, please do not hesitate to contact me.    Sincerely,    STARR Mike

## 2025-07-10 NOTE — PROGRESS NOTES
Subjective     Patient ID: Theodora Murray is a 39 y.o. female.    Chief Complaint: Nasal Congestion (Symptoms have been present for 2-3 days.  Denies fever.), Cough, Flank Pain (She says she still has pain in her L rib area from when she fell previously.  ), and Leg Pain (She says she has knee pain and thigh pain mainly on her R side)    Cough    Flank Pain  Associated symptoms include leg pain.   Leg Pain       Review of Systems   Respiratory:  Positive for cough.    Genitourinary:  Positive for flank pain.   Musculoskeletal:  Positive for leg pain.          Objective     Physical Exam  Vitals and nursing note reviewed.   Constitutional:       Appearance: Normal appearance.   HENT:      Head: Normocephalic and atraumatic.      Right Ear: Tympanic membrane, ear canal and external ear normal.      Left Ear: Tympanic membrane, ear canal and external ear normal.      Nose: Congestion present.      Right Sinus: Maxillary sinus tenderness and frontal sinus tenderness present.      Left Sinus: Maxillary sinus tenderness and frontal sinus tenderness present.      Mouth/Throat:      Mouth: Mucous membranes are moist.      Pharynx: Oropharynx is clear.   Eyes:      Pupils: Pupils are equal, round, and reactive to light.   Cardiovascular:      Rate and Rhythm: Normal rate and regular rhythm.      Pulses: Normal pulses.      Heart sounds: Normal heart sounds.   Pulmonary:      Effort: Pulmonary effort is normal.      Breath sounds: Normal breath sounds.   Musculoskeletal:         General: No swelling or tenderness.   Lymphadenopathy:      Cervical: Cervical adenopathy present.   Skin:     General: Skin is warm.      Capillary Refill: Capillary refill takes less than 2 seconds.   Neurological:      General: No focal deficit present.      Mental Status: She is alert.   Psychiatric:         Mood and Affect: Mood normal.            Assessment and Plan     1. Acute non-recurrent pansinusitis  -     cefTRIAXone injection 1 g  -      azithromycin (Z-ROSA MARIA) 250 MG tablet; Take 2 tablets by mouth on day 1; Take 1 tablet by mouth on days 2-5  Dispense: 6 tablet; Refill: 0  -     benzonatate (TESSALON) 200 MG capsule; Take 1 capsule (200 mg total) by mouth 3 (three) times daily as needed for Cough.  Dispense: 30 capsule; Refill: 0  -     fluticasone propionate (FLONASE) 50 mcg/actuation nasal spray; 2 sprays (100 mcg total) by Each Nostril route once daily.  Dispense: 11.1 mL; Refill: 0    2. Other chronic pain  -     meloxicam (MOBIC) 15 MG tablet; Take 1 tablet (15 mg total) by mouth once daily. For pain  Dispense: 90 tablet; Refill: 2        Will trial mobic for chronic pain. No instability noted to knee. No tenderness upon palpation of rib cage         No follow-ups on file.

## 2025-07-18 ENCOUNTER — OFFICE VISIT (OUTPATIENT)
Dept: FAMILY MEDICINE | Facility: CLINIC | Age: 40
End: 2025-07-18
Payer: COMMERCIAL

## 2025-07-18 VITALS
RESPIRATION RATE: 18 BRPM | DIASTOLIC BLOOD PRESSURE: 84 MMHG | WEIGHT: 212 LBS | SYSTOLIC BLOOD PRESSURE: 123 MMHG | TEMPERATURE: 98 F | OXYGEN SATURATION: 98 % | BODY MASS INDEX: 32.13 KG/M2 | HEART RATE: 73 BPM | HEIGHT: 68 IN

## 2025-07-18 DIAGNOSIS — M25.561 ACUTE PAIN OF BOTH KNEES: Primary | ICD-10-CM

## 2025-07-18 DIAGNOSIS — M25.562 ACUTE PAIN OF BOTH KNEES: Primary | ICD-10-CM

## 2025-07-18 DIAGNOSIS — M79.602 PAIN IN BOTH UPPER EXTREMITIES: ICD-10-CM

## 2025-07-18 DIAGNOSIS — M79.601 PAIN IN BOTH UPPER EXTREMITIES: ICD-10-CM

## 2025-07-18 DIAGNOSIS — M54.2 NECK PAIN: ICD-10-CM

## 2025-07-18 LAB
ANION GAP SERPL CALCULATED.3IONS-SCNC: 13 MMOL/L (ref 7–16)
BUN SERPL-MCNC: 13 MG/DL (ref 7–19)
BUN/CREAT SERPL: 18 (ref 6–20)
CALCIUM SERPL-MCNC: 9 MG/DL (ref 8.4–10.2)
CHLORIDE SERPL-SCNC: 108 MMOL/L (ref 98–107)
CO2 SERPL-SCNC: 24 MMOL/L (ref 22–29)
CREAT SERPL-MCNC: 0.71 MG/DL (ref 0.55–1.02)
EGFR (NO RACE VARIABLE) (RUSH/TITUS): 112 ML/MIN/1.73M2
ERYTHROCYTE [SEDIMENTATION RATE] IN BLOOD BY WESTERGREN METHOD: 5 MM/HR (ref 0–20)
GLUCOSE SERPL-MCNC: 83 MG/DL (ref 74–100)
POTASSIUM SERPL-SCNC: 4.1 MMOL/L (ref 3.5–5.1)
RHEUMATOID FACT SER NEPH-ACNC: NEGATIVE [IU]/ML
SODIUM SERPL-SCNC: 141 MMOL/L (ref 136–145)

## 2025-07-18 PROCEDURE — 3008F BODY MASS INDEX DOCD: CPT | Mod: CPTII,,,

## 2025-07-18 PROCEDURE — 80048 BASIC METABOLIC PNL TOTAL CA: CPT | Mod: ,,, | Performed by: CLINICAL MEDICAL LABORATORY

## 2025-07-18 PROCEDURE — 86038 ANTINUCLEAR ANTIBODIES: CPT | Mod: ,,, | Performed by: CLINICAL MEDICAL LABORATORY

## 2025-07-18 PROCEDURE — 99214 OFFICE O/P EST MOD 30 MIN: CPT | Mod: 25,,,

## 2025-07-18 PROCEDURE — 96372 THER/PROPH/DIAG INJ SC/IM: CPT | Mod: ,,,

## 2025-07-18 PROCEDURE — 1160F RVW MEDS BY RX/DR IN RCRD: CPT | Mod: CPTII,,,

## 2025-07-18 PROCEDURE — 1159F MED LIST DOCD IN RCRD: CPT | Mod: CPTII,,,

## 2025-07-18 PROCEDURE — 3079F DIAST BP 80-89 MM HG: CPT | Mod: CPTII,,,

## 2025-07-18 PROCEDURE — 3074F SYST BP LT 130 MM HG: CPT | Mod: CPTII,,,

## 2025-07-18 PROCEDURE — 85651 RBC SED RATE NONAUTOMATED: CPT | Mod: ,,, | Performed by: CLINICAL MEDICAL LABORATORY

## 2025-07-18 PROCEDURE — 86430 RHEUMATOID FACTOR TEST QUAL: CPT | Mod: ,,, | Performed by: CLINICAL MEDICAL LABORATORY

## 2025-07-18 RX ORDER — KETOROLAC TROMETHAMINE 10 MG/1
10 TABLET, FILM COATED ORAL EVERY 6 HOURS PRN
Qty: 20 TABLET | Refills: 0 | Status: SHIPPED | OUTPATIENT
Start: 2025-07-18 | End: 2025-07-24

## 2025-07-18 RX ORDER — KETOROLAC TROMETHAMINE 30 MG/ML
60 INJECTION, SOLUTION INTRAMUSCULAR; INTRAVENOUS
Status: COMPLETED | OUTPATIENT
Start: 2025-07-18 | End: 2025-07-18

## 2025-07-18 RX ADMIN — KETOROLAC TROMETHAMINE 60 MG: 30 INJECTION, SOLUTION INTRAMUSCULAR; INTRAVENOUS at 10:07

## 2025-07-18 NOTE — LETTER
July 18, 2025      [facility] [address] [place] [address]  Phone: [phone]  Fax: [phone]       Patient: Theodora Murray   YOB: 1985  Date of Visit: 07/18/2025    To Whom It May Concern:    JESÚS Murray  was at [facility] on 07/18/2025. The patient may return to work/school on 07/21/2025 with no restrictions. If you have any questions or concerns, or if I can be of further assistance, please do not hesitate to contact me.    Sincerely,    STARR Mike

## 2025-07-19 NOTE — PROGRESS NOTES
Subjective     Patient ID: Theodora Murray is a 39 y.o. female.    Chief Complaint: Knee Pain (Patient states she is here today for pain in both knees. Patient is complaining of arm and neck pain as well. Patient is still saying this is from the cartwheel she did 2-3 weeks ago. )    Knee Pain       Review of Systems       Objective     Physical Exam  Vitals and nursing note reviewed.   Constitutional:       Appearance: Normal appearance.   HENT:      Head: Normocephalic and atraumatic.      Right Ear: Tympanic membrane, ear canal and external ear normal.      Left Ear: Tympanic membrane, ear canal and external ear normal.      Nose: Nose normal.      Mouth/Throat:      Mouth: Mucous membranes are moist.      Pharynx: Oropharynx is clear.   Eyes:      Pupils: Pupils are equal, round, and reactive to light.   Cardiovascular:      Rate and Rhythm: Normal rate and regular rhythm.      Pulses: Normal pulses.      Heart sounds: Normal heart sounds.   Pulmonary:      Effort: Pulmonary effort is normal.      Breath sounds: Normal breath sounds.   Abdominal:      General: Bowel sounds are normal.      Palpations: Abdomen is soft.   Musculoskeletal:         General: No swelling or signs of injury. Normal range of motion.      Cervical back: Normal range of motion.   Skin:     General: Skin is warm.      Capillary Refill: Capillary refill takes less than 2 seconds.   Neurological:      General: No focal deficit present.      Mental Status: She is alert.   Psychiatric:         Mood and Affect: Mood normal.            Assessment and Plan     1. Acute pain of both knees  -     Sedimentation Rate; Future; Expected date: 07/18/2025  -     Rheumatoid Factor Screen; Future; Expected date: 07/18/2025  -     Basic Metabolic Panel; Future; Expected date: 07/18/2025  -     BETHANY EIA w/ Reflex to dsDNA/BECKY; Future; Expected date: 07/18/2025  -     ketorolac injection 60 mg  -     ketorolac (TORADOL) 10 mg tablet; Take 1 tablet (10 mg total) by  mouth every 6 (six) hours as needed for Pain.  Dispense: 20 tablet; Refill: 0    2. Neck pain  -     Sedimentation Rate; Future; Expected date: 07/18/2025  -     Rheumatoid Factor Screen; Future; Expected date: 07/18/2025  -     Basic Metabolic Panel; Future; Expected date: 07/18/2025  -     BETHANY EIA w/ Reflex to dsDNA/BECKY; Future; Expected date: 07/18/2025  -     ketorolac injection 60 mg  -     ketorolac (TORADOL) 10 mg tablet; Take 1 tablet (10 mg total) by mouth every 6 (six) hours as needed for Pain.  Dispense: 20 tablet; Refill: 0    3. Pain in both upper extremities  -     Sedimentation Rate; Future; Expected date: 07/18/2025  -     Rheumatoid Factor Screen; Future; Expected date: 07/18/2025  -     Basic Metabolic Panel; Future; Expected date: 07/18/2025  -     BETHANY EIA w/ Reflex to dsDNA/BECKY; Future; Expected date: 07/18/2025  -     ketorolac injection 60 mg  -     ketorolac (TORADOL) 10 mg tablet; Take 1 tablet (10 mg total) by mouth every 6 (six) hours as needed for Pain.  Dispense: 20 tablet; Refill: 0        Does not appear we are managing Ms. Murray's pain well. If results are inconclusive will refer to pain management for better management. No musculoskeletal abnormalities noted on exam         No follow-ups on file.

## 2025-07-21 ENCOUNTER — TELEPHONE (OUTPATIENT)
Dept: FAMILY MEDICINE | Facility: CLINIC | Age: 40
End: 2025-07-21
Payer: COMMERCIAL

## 2025-07-21 RX ORDER — IBUPROFEN 800 MG/1
800 TABLET, FILM COATED ORAL 3 TIMES DAILY
Qty: 30 TABLET | Refills: 1 | Status: SHIPPED | OUTPATIENT
Start: 2025-07-21 | End: 2025-07-24

## 2025-07-21 RX ORDER — POTASSIUM CHLORIDE 20 MEQ/1
20 TABLET, EXTENDED RELEASE ORAL 3 TIMES DAILY
Qty: 90 TABLET | Refills: 1 | Status: SHIPPED | OUTPATIENT
Start: 2025-07-21

## 2025-07-21 RX ORDER — ASPIRIN 325 MG
50000 TABLET, DELAYED RELEASE (ENTERIC COATED) ORAL
Qty: 8 CAPSULE | Refills: 0 | Status: SHIPPED | OUTPATIENT
Start: 2025-07-21

## 2025-07-21 RX ORDER — ATENOLOL 100 MG/1
100 TABLET ORAL DAILY
Qty: 90 TABLET | Refills: 1 | Status: SHIPPED | OUTPATIENT
Start: 2025-07-21 | End: 2025-07-24

## 2025-07-21 NOTE — TELEPHONE ENCOUNTER
Copied from CRM #[id number]. Topic: Medications - New Medication Request  >> Jul 21, 2025  3:14 PM Devin Lyn wrote:  Pt called to see if she could get something else for pain. She said the ketorolac (TORADOL) 10 mg tablet is not helping. Please give her a call back at [phone]

## 2025-07-21 NOTE — TELEPHONE ENCOUNTER
Copied from CRM #[id number]. Topic: Medications - Medication Refill  >> Jul 21, 2025 10:17 AM Isabela wrote:  Who Called: Theodora Murray    Refill or New Rx:Refill  RX Name and Strength: potassium chloride SA (K-DUR,KLOR-CON) 20 MEQ tablet - - 9/9/2022 - --  Sig - Route: Take 20 mEq by mouth 3 (three) times daily. - Oral    atenoloL (TENORMIN) 100 MG tablet - - 9/8/2022 - --  Sig - Route: Take 100 mg by mouth once daily. - Oral    cholecalciferol, vitamin D3, 1,250 mcg (50,000 unit) capsule - - 5/2/2025 - --  Sig - Route: Take 50,000 Units by mouth every 7 days. - Oral    Local or Mail Order: Local    List of preferred pharmacies on file (remove unneeded): [unfilled]  If different Pharmacy is requested, enter Pharmacy information here including location and phone number: [address] [address]  Phone: [phone] Fax: [phone]        Preferred Method of Contact: Phone Call  Patient's Preferred Phone Number on File: [phone]   Best Call Back Number, if different:  Additional Information Pt is wanting to know if she could get something else besides the ketorolac (TORADOL) 10 mg tablet or would she need to come in for appt.

## 2025-07-22 ENCOUNTER — TELEPHONE (OUTPATIENT)
Dept: FAMILY MEDICINE | Facility: CLINIC | Age: 40
End: 2025-07-22
Payer: COMMERCIAL

## 2025-07-22 LAB — ANA SER QL: NEGATIVE

## 2025-07-22 NOTE — TELEPHONE ENCOUNTER
Copied from CRM #[id number]. Topic: General Inquiry - Patient Advice  >> Jul 22, 2025  2:58 PM Kristie wrote:  Who Called: Theodora Murray        Who Left Message for Patient:  Does the patient know what this is regarding?:YES      Preferred Method of Contact: Phone Call  Patient's Preferred Phone Number on File: [phone]   Best Call Back Number, if different:  Additional Information: PATIENT SAYS SHE WANTS TO LET THE NURSE KNOW THAT THE PAIN IS NOT IN HER KNEE BUT IN HER OUTER THIGH.  >> Jul 22, 2025  3:07 PM STARR Lombardi wrote:  If unable to tolerate ibuprofen, try otc tylenol and voltaren gel. Should have a pain management appt being scheduled soon, f/u in clinic with pcp if pain worsens before appt  ----- Message -----  From: Kristie Montanez  Sent: 7/22/2025   3:00 PM CDT  To: José Miguel Cagle Staff

## 2025-07-24 ENCOUNTER — OFFICE VISIT (OUTPATIENT)
Dept: FAMILY MEDICINE | Facility: CLINIC | Age: 40
End: 2025-07-24
Payer: COMMERCIAL

## 2025-07-24 VITALS
SYSTOLIC BLOOD PRESSURE: 128 MMHG | TEMPERATURE: 97 F | RESPIRATION RATE: 18 BRPM | BODY MASS INDEX: 32.13 KG/M2 | OXYGEN SATURATION: 96 % | WEIGHT: 212 LBS | HEART RATE: 84 BPM | HEIGHT: 68 IN | DIASTOLIC BLOOD PRESSURE: 86 MMHG

## 2025-07-24 DIAGNOSIS — R52 PAIN MANAGEMENT: ICD-10-CM

## 2025-07-24 DIAGNOSIS — R05.9 COUGH, UNSPECIFIED TYPE: ICD-10-CM

## 2025-07-24 DIAGNOSIS — J02.9 SORE THROAT: ICD-10-CM

## 2025-07-24 DIAGNOSIS — J34.89 SINUS PRESSURE: Primary | ICD-10-CM

## 2025-07-24 DIAGNOSIS — K21.9 GASTROESOPHAGEAL REFLUX DISEASE, UNSPECIFIED WHETHER ESOPHAGITIS PRESENT: ICD-10-CM

## 2025-07-24 LAB
CTP QC/QA: YES
MOLECULAR STREP A: NEGATIVE
POC MOLECULAR INFLUENZA A AGN: NEGATIVE
POC MOLECULAR INFLUENZA B AGN: NEGATIVE
SARS-COV-2 RDRP RESP QL NAA+PROBE: NEGATIVE

## 2025-07-24 PROCEDURE — 87502 INFLUENZA DNA AMP PROBE: CPT | Mod: QW,,,

## 2025-07-24 PROCEDURE — 1159F MED LIST DOCD IN RCRD: CPT | Mod: CPTII,,,

## 2025-07-24 PROCEDURE — 99214 OFFICE O/P EST MOD 30 MIN: CPT | Mod: 25,,,

## 2025-07-24 PROCEDURE — 3074F SYST BP LT 130 MM HG: CPT | Mod: CPTII,,,

## 2025-07-24 PROCEDURE — 3008F BODY MASS INDEX DOCD: CPT | Mod: CPTII,,,

## 2025-07-24 PROCEDURE — 1160F RVW MEDS BY RX/DR IN RCRD: CPT | Mod: CPTII,,,

## 2025-07-24 PROCEDURE — 87651 STREP A DNA AMP PROBE: CPT | Mod: QW,,,

## 2025-07-24 PROCEDURE — 3079F DIAST BP 80-89 MM HG: CPT | Mod: CPTII,,,

## 2025-07-24 PROCEDURE — 87635 SARS-COV-2 COVID-19 AMP PRB: CPT | Mod: QW,,,

## 2025-07-24 PROCEDURE — 96372 THER/PROPH/DIAG INJ SC/IM: CPT | Mod: ,,,

## 2025-07-24 RX ORDER — FLUTICASONE PROPIONATE 50 MCG
2 SPRAY, SUSPENSION (ML) NASAL DAILY
Qty: 11.1 ML | Refills: 0 | Status: SHIPPED | OUTPATIENT
Start: 2025-07-24

## 2025-07-24 RX ORDER — DEXAMETHASONE SODIUM PHOSPHATE 4 MG/ML
4 INJECTION, SOLUTION INTRA-ARTICULAR; INTRALESIONAL; INTRAMUSCULAR; INTRAVENOUS; SOFT TISSUE
Status: COMPLETED | OUTPATIENT
Start: 2025-07-24 | End: 2025-07-24

## 2025-07-24 RX ORDER — METHYLPREDNISOLONE ACETATE 40 MG/ML
40 INJECTION, SUSPENSION INTRA-ARTICULAR; INTRALESIONAL; INTRAMUSCULAR; SOFT TISSUE
Status: COMPLETED | OUTPATIENT
Start: 2025-07-24 | End: 2025-07-24

## 2025-07-24 RX ORDER — OMEPRAZOLE 20 MG/1
20 CAPSULE, DELAYED RELEASE ORAL DAILY
Qty: 90 CAPSULE | Refills: 3 | Status: SHIPPED | OUTPATIENT
Start: 2025-07-24 | End: 2026-07-24

## 2025-07-24 RX ADMIN — DEXAMETHASONE SODIUM PHOSPHATE 4 MG: 4 INJECTION, SOLUTION INTRA-ARTICULAR; INTRALESIONAL; INTRAMUSCULAR; INTRAVENOUS; SOFT TISSUE at 10:07

## 2025-07-24 RX ADMIN — METHYLPREDNISOLONE ACETATE 40 MG: 40 INJECTION, SUSPENSION INTRA-ARTICULAR; INTRALESIONAL; INTRAMUSCULAR; SOFT TISSUE at 10:07

## 2025-07-24 NOTE — PROGRESS NOTES
Subjective     Patient ID: Theodora Murray is a 39 y.o. female.    Chief Complaint: Cough (Patient states she is here for her sinus problems she has had for 3-4 days. No over the counter medication have been taken. ), Nasal Congestion, Headache, and Sore Throat    HPI  Review of Systems       Objective     Physical Exam  Vitals and nursing note reviewed.   Constitutional:       Appearance: Normal appearance.   HENT:      Head: Normocephalic and atraumatic.      Right Ear: Tympanic membrane, ear canal and external ear normal.      Left Ear: Tympanic membrane, ear canal and external ear normal.      Nose: Congestion present.      Right Sinus: Maxillary sinus tenderness present.      Left Sinus: Maxillary sinus tenderness present.      Mouth/Throat:      Mouth: Mucous membranes are moist.      Pharynx: Oropharynx is clear.   Eyes:      Pupils: Pupils are equal, round, and reactive to light.   Cardiovascular:      Rate and Rhythm: Normal rate and regular rhythm.      Pulses: Normal pulses.      Heart sounds: Normal heart sounds.   Pulmonary:      Effort: Pulmonary effort is normal.      Breath sounds: Normal breath sounds.   Musculoskeletal:         General: No signs of injury.   Lymphadenopathy:      Cervical: No cervical adenopathy.   Skin:     General: Skin is warm.      Capillary Refill: Capillary refill takes less than 2 seconds.   Neurological:      General: No focal deficit present.      Mental Status: She is alert.   Psychiatric:         Mood and Affect: Mood normal.            Assessment and Plan     1. Sinus pressure  -     dexAMETHasone injection 4 mg  -     methylPREDNISolone acetate injection 40 mg  -     fluticasone propionate (FLONASE) 50 mcg/actuation nasal spray; 2 sprays (100 mcg total) by Each Nostril route once daily.  Dispense: 11.1 mL; Refill: 0    2. Cough, unspecified type  -     POCT Strep A, Molecular  -     POCT Influenza A/B Molecular  -     POCT COVID-19 Rapid Screening    3. Sore throat  -      POCT Strep A, Molecular  -     POCT Influenza A/B Molecular  -     POCT COVID-19 Rapid Screening    4. Gastroesophageal reflux disease, unspecified whether esophagitis present  -     omeprazole (PRILOSEC) 20 MG capsule; Take 1 capsule (20 mg total) by mouth once daily.  Dispense: 90 capsule; Refill: 3        Patient presents to clinic with sinus congestion and nasal congestion for 3-4 days.  Will administer steroid injection in clinic to help with symptoms until able to resume Flonase.  Patient requests refill on Prilosec, acid reflux is well controlled when taking.  We will check on status of pain management referral       No follow-ups on file.

## 2025-07-24 NOTE — LETTER
July 24, 2025    Theodora Murray  [address]  [place] [address]             [facility]  Family Medicine  [facility] [place] [address]  Phone: [phone]  Fax: [phone]   July 24, 2025     Patient: Theodora Murray   YOB: 1985   Date of Visit: 7/24/2025       To Whom it May Concern:    Theodora Murray was seen in my clinic on 7/24/2025. She may return to work on 7/25/2025.    Please excuse her from any classes or work missed.    If you have any questions or concerns, please don't hesitate to call.    Sincerely,         Gurjit Valdez, DIAP

## 2025-08-04 ENCOUNTER — OFFICE VISIT (OUTPATIENT)
Dept: FAMILY MEDICINE | Facility: CLINIC | Age: 40
End: 2025-08-04

## 2025-08-04 VITALS
DIASTOLIC BLOOD PRESSURE: 81 MMHG | TEMPERATURE: 97 F | RESPIRATION RATE: 18 BRPM | SYSTOLIC BLOOD PRESSURE: 121 MMHG | OXYGEN SATURATION: 96 % | HEART RATE: 63 BPM | HEIGHT: 68 IN | WEIGHT: 216 LBS | BODY MASS INDEX: 32.74 KG/M2

## 2025-08-04 DIAGNOSIS — R52 PAIN MANAGEMENT: ICD-10-CM

## 2025-08-04 DIAGNOSIS — G89.29 OTHER CHRONIC PAIN: Primary | ICD-10-CM

## 2025-08-04 PROCEDURE — 96372 THER/PROPH/DIAG INJ SC/IM: CPT | Mod: ,,,

## 2025-08-04 PROCEDURE — 99213 OFFICE O/P EST LOW 20 MIN: CPT | Mod: 25,,,

## 2025-08-04 RX ORDER — IBUPROFEN 800 MG/1
800 TABLET, FILM COATED ORAL 3 TIMES DAILY
Qty: 30 TABLET | Refills: 0 | Status: SHIPPED | OUTPATIENT
Start: 2025-08-04

## 2025-08-04 RX ORDER — KETOROLAC TROMETHAMINE 30 MG/ML
60 INJECTION, SOLUTION INTRAMUSCULAR; INTRAVENOUS
Status: COMPLETED | OUTPATIENT
Start: 2025-08-04 | End: 2025-08-04

## 2025-08-04 RX ORDER — CYCLOBENZAPRINE HCL 5 MG
5 TABLET ORAL 3 TIMES DAILY
COMMUNITY
Start: 2025-08-01

## 2025-08-04 RX ADMIN — KETOROLAC TROMETHAMINE 60 MG: 30 INJECTION, SOLUTION INTRAMUSCULAR; INTRAVENOUS at 02:08

## 2025-08-04 NOTE — LETTER
August 4, 2025      [facility]  [address] [place] [address]  Phone: [phone]  Fax: [phone]       Patient: Theodora Murray   YOB: 1985  Date of Visit: 08/04/2025    To Whom It May Concern:    JESÚS Murray  was at [facility] on 08/04/2025. The patient may return to work/school on 08/05/2025 with no restrictions. If you have any questions or concerns, or if I can be of further assistance, please do not hesitate to contact me.    Sincerely,    STARR Lombardi

## 2025-08-05 NOTE — PROGRESS NOTES
Subjective     Patient ID: Theodora Murray is a 39 y.o. female.    Chief Complaint: Leg Pain (Patient states she is still hurting from doing the cartwheel a month ago. Says the pain is located on her thigh and left side of ribs. 9/10 pain scale. Spoke to the patient about her referral to pain management, informed her the provider at [facility] was not accepting new patient. She agreed to being seen at [facility] in [place]. Will send referral there ), Abdominal Pain, and Medication Problem (States the voltarne gel caused her to turn red to the area she applied it to. )    Leg Pain     Abdominal Pain      Review of Systems   Gastrointestinal:  Positive for abdominal pain.   Musculoskeletal:  Positive for leg pain.          Objective     Physical Exam  Vitals and nursing note reviewed.   Constitutional:       Appearance: Normal appearance.   HENT:      Head: Normocephalic and atraumatic.      Right Ear: External ear normal.      Left Ear: External ear normal.   Eyes:      Pupils: Pupils are equal, round, and reactive to light.   Cardiovascular:      Rate and Rhythm: Normal rate and regular rhythm.      Pulses: Normal pulses.      Heart sounds: Normal heart sounds.   Pulmonary:      Effort: Pulmonary effort is normal.      Breath sounds: Normal breath sounds.   Abdominal:      General: Bowel sounds are normal.      Palpations: Abdomen is soft.   Musculoskeletal:         General: No deformity or signs of injury.   Lymphadenopathy:      Cervical: No cervical adenopathy.   Skin:     General: Skin is warm.      Capillary Refill: Capillary refill takes less than 2 seconds.   Neurological:      General: No focal deficit present.      Mental Status: She is alert.   Psychiatric:         Mood and Affect: Mood normal.            Assessment and Plan     1. Other chronic pain  -     ketorolac injection 60 mg  -     ibuprofen (ADVIL,MOTRIN) 800 MG tablet; Take 1 tablet (800 mg total) by mouth 3 (three) times daily.  Dispense: 30 tablet;  Refill: 0    2. Pain management  -     Ambulatory referral/consult to Pain Clinic; Future; Expected date: 08/11/2025    No abnormalities noted on exam. Awaiting pain management appt             No follow-ups on file.

## 2025-08-07 ENCOUNTER — OFFICE VISIT (OUTPATIENT)
Dept: FAMILY MEDICINE | Facility: CLINIC | Age: 40
End: 2025-08-07

## 2025-08-07 VITALS
DIASTOLIC BLOOD PRESSURE: 89 MMHG | RESPIRATION RATE: 18 BRPM | BODY MASS INDEX: 32.32 KG/M2 | TEMPERATURE: 98 F | OXYGEN SATURATION: 98 % | HEIGHT: 68 IN | WEIGHT: 213.25 LBS | HEART RATE: 68 BPM | SYSTOLIC BLOOD PRESSURE: 132 MMHG

## 2025-08-07 DIAGNOSIS — J02.9 SORE THROAT: ICD-10-CM

## 2025-08-07 DIAGNOSIS — R11.0 NAUSEA: ICD-10-CM

## 2025-08-07 DIAGNOSIS — J34.89 POSTERIOR RHINORRHEA: Primary | ICD-10-CM

## 2025-08-07 LAB
CTP QC/QA: YES
CTP QC/QA: YES
POC MOLECULAR INFLUENZA A AGN: NEGATIVE
POC MOLECULAR INFLUENZA B AGN: NEGATIVE
SARS-COV-2 RDRP RESP QL NAA+PROBE: NEGATIVE

## 2025-08-07 PROCEDURE — 96372 THER/PROPH/DIAG INJ SC/IM: CPT | Mod: ,,, | Performed by: NURSE PRACTITIONER

## 2025-08-07 PROCEDURE — 87502 INFLUENZA DNA AMP PROBE: CPT | Mod: QW,,, | Performed by: NURSE PRACTITIONER

## 2025-08-07 PROCEDURE — 99213 OFFICE O/P EST LOW 20 MIN: CPT | Mod: 25,,, | Performed by: NURSE PRACTITIONER

## 2025-08-07 PROCEDURE — 87635 SARS-COV-2 COVID-19 AMP PRB: CPT | Mod: QW,,, | Performed by: NURSE PRACTITIONER

## 2025-08-07 RX ORDER — BETAMETHASONE SODIUM PHOSPHATE AND BETAMETHASONE ACETATE 3; 3 MG/ML; MG/ML
6 INJECTION, SUSPENSION INTRA-ARTICULAR; INTRALESIONAL; INTRAMUSCULAR; SOFT TISSUE ONCE
Status: COMPLETED | OUTPATIENT
Start: 2025-08-07 | End: 2025-08-07

## 2025-08-07 RX ADMIN — BETAMETHASONE SODIUM PHOSPHATE AND BETAMETHASONE ACETATE 6 MG: 3; 3 INJECTION, SUSPENSION INTRA-ARTICULAR; INTRALESIONAL; INTRAMUSCULAR; SOFT TISSUE at 11:08

## 2025-08-07 NOTE — LETTER
August 7, 2025    Theodora Murray  [address]  [place] [address]             [facility]  Family Medicine  [address]   [place] [address]  Phone: [phone]  Fax: [phone]   August 7, 2025     Patient: Theodora Murray   YOB: 1985   Date of Visit: 8/7/2025       To Whom it May Concern:    Theodora Murray was seen in my clinic on 8/7/2025. She may return to work on 8/8/2025.    Please excuse her from any classes or work missed.    If you have any questions or concerns, please don't hesitate to call.    Sincerely,     Joana Arboleda, AUDRA, FNP

## 2025-08-07 NOTE — PROGRESS NOTES
Joana Arboleda DNP, STARR    [address] [place] [address]     PATIENT NAME: Theodora Murray  : 1985  DATE: 25  MRN: [id number]      Billing Provider: Joana Arboleda DNP, FNP  Level of Service:   Patient PCP Information       None on File            Reason for Visit / Chief Complaint: Sinus Problem (Sinus problems for about 5 days. Sore throat and cough. Vomiting at work last night. Been taking ed a hist, not helping)       Update PCP  Update Chief Complaint         History of Present Illness / Problem Focused Workflow     Theodora Murray presents to the clinic with Sinus Problem (Sinus problems for about 5 days. Sore throat and cough. Vomiting at work last night. Been taking ed a hist, not helping)     Review of Systems     Review of Systems   Constitutional:  Negative for appetite change, fatigue, fever and unexpected weight change.   HENT:  Positive for postnasal drip and sore throat. Negative for hearing loss.    Eyes:  Negative for visual disturbance.   Respiratory:  Negative for shortness of breath.    Cardiovascular:  Negative for chest pain.   Gastrointestinal:  Positive for nausea. Negative for abdominal pain, constipation, diarrhea and vomiting.   Genitourinary:  Negative for dysuria.   Musculoskeletal:  Negative for back pain.   Psychiatric/Behavioral:  Negative for sleep disturbance.         Medical / Social / Family History     Past Medical History:   Diagnosis Date    Abnormal uterine bleeding About 2 months ago    Chronic GERD     Depression     Generalized anxiety disorder     Hypertension     Hypokalemia     Unspecified hemorrhoids        Past Surgical History:   Procedure Laterality Date    DIAGNOSTIC LAPAROSCOPY N/A 2022    Procedure: LAPAROSCOPY, DIAGNOSTIC;  Surgeon: Claude Chery MD;  Location: [facility];  Service: OB/GYN;  Laterality: N/A;    HYSTEROSCOPY WITH DILATION AND CURETTAGE OF UTERUS N/A 2022    Procedure:  HYSTEROSCOPY, WITH DILATION AND CURETTAGE OF UTERUS;  Surgeon: Claude Chery MD;  Location: [facility];  Service: OB/GYN;  Laterality: N/A;       Social History  Ms. Theodora Murray  reports that she quit smoking about 2 years ago. Her smoking use included vaping with nicotine and cigarettes. She started smoking about 3 years ago. She has a 0.5 pack-year smoking history. She has never been exposed to tobacco smoke. She has never used smokeless tobacco. She reports that she does not currently use alcohol. She reports that she does not use drugs.    Family History  Ms. Theodora Murray's family history includes Asthma in her brother; Diabetes in her father; Hypertension in her mother; Migraines in her sister; Osteoarthritis in her mother; Osteoporosis in her mother.    Medications and Allergies     Medications  Outpatient Medications Marked as Taking for the 8/7/25 encounter (Office Visit) with Joana Arboleda, AUDRA, DIAP   Medication Sig Dispense Refill    albuterol (PROVENTIL/VENTOLIN HFA) 90 mcg/actuation inhaler Inhale 2 puffs into the lungs every 6 (six) hours as needed for Wheezing. 18 g 1    cholecalciferol, vitamin D3, 1,250 mcg (50,000 unit) capsule Take 1 capsule (50,000 Units total) by mouth every 7 days. 8 capsule 0    cimetidine (TAGAMET) 200 MG tablet Take 200 mg by mouth 2 (two) times daily as needed.      cyclobenzaprine (FLEXERIL) 5 MG tablet Take 5 mg by mouth 3 (three) times daily.      fluticasone propionate (FLONASE) 50 mcg/actuation nasal spray 2 sprays (100 mcg total) by Each Nostril route once daily. 11.1 mL 0    gabapentin (NEURONTIN) 300 MG capsule Take 1 capsule (300 mg total) by mouth once daily. 90 capsule 1    ibuprofen (ADVIL,MOTRIN) 800 MG tablet Take 1 tablet (800 mg total) by mouth 3 (three) times daily. 30 tablet 0    LEXAPRO 10 mg tablet Take 10 mg by mouth every morning.      omeprazole (PRILOSEC) 20 MG capsule Take 1 capsule (20 mg total) by mouth once daily. 90 capsule 3    oxybutynin  "(DITROPAN) 5 MG Tab Take 5 mg by mouth 2 (two) times daily.      potassium chloride SA (K-DUR,KLOR-CON) 20 MEQ tablet Take 1 tablet (20 mEq total) by mouth 3 (three) times daily. 90 tablet 1     Current Facility-Administered Medications for the 8/7/25 encounter (Office Visit) with Joana Arboldea DNP, FNP   Medication Dose Route Frequency Provider Last Rate Last Admin    [COMPLETED] betamethasone acetate-betamethasone sodium phosphate injection 6 mg  6 mg Intramuscular Once Joana Arboleda DNP, FNP   6 mg at 08/07/25 1147       Allergies  Review of patient's allergies indicates:  No Known Allergies    Physical Examination     Vitals:    08/07/25 1040   BP: 132/89   BP Location: Left arm   Patient Position: Sitting   Pulse: 68   Resp: 18   Temp: 98.3 °F (36.8 °C)   TempSrc: Oral   SpO2: 98%   Weight: 96.7 kg (213 lb 4 oz)   Height: 5' 8" (1.727 m)     Physical Exam  Vitals and nursing note reviewed.   Constitutional:       General: She is not in acute distress.  HENT:      Nose: Rhinorrhea present.      Mouth/Throat:      Mouth: Mucous membranes are moist.   Eyes:      Pupils: Pupils are equal, round, and reactive to light.   Cardiovascular:      Rate and Rhythm: Normal rate and regular rhythm.      Pulses: Normal pulses.      Heart sounds: Normal heart sounds. No murmur heard.  Pulmonary:      Effort: Pulmonary effort is normal. No respiratory distress.      Breath sounds: Normal breath sounds. No wheezing, rhonchi or rales.   Chest:      Chest wall: No tenderness.   Abdominal:      General: Bowel sounds are normal.      Palpations: Abdomen is soft.   Musculoskeletal:         General: Normal range of motion.      Cervical back: Normal range of motion and neck supple.      Right lower leg: No edema.      Left lower leg: No edema.   Skin:     General: Skin is warm and dry.   Neurological:      General: No focal deficit present.      Mental Status: She is alert and oriented to person, place, and time.      "     Assessment and Plan (including Health Maintenance)      Problem List  Smart Sets  Document Outside HM   :    Plan:         Health Maintenance Due   Topic Date Due    HIV Screening  Never done    TETANUS VACCINE  Never done    Hemoglobin A1c (Diabetic Prevention Screening)  Never done    COVID-19 Vaccine (5 - 2024-25 season) 09/01/2024    Cervical Cancer Screening  09/22/2025       Problem List Items Addressed This Visit    None  Visit Diagnoses         Posterior rhinorrhea    -  Primary    Relevant Medications    betamethasone acetate-betamethasone sodium phosphate injection 6 mg (Completed)      Sore throat        Relevant Orders    POCT COVID-19 Rapid Screening (Completed)    POCT Influenza A/B Molecular (Completed)      Nausea              Posterior rhinorrhea  -     betamethasone acetate-betamethasone sodium phosphate injection 6 mg    Sore throat  -     POCT COVID-19 Rapid Screening  -     Cancel: POCT Strep A, Molecular  -     POCT Influenza A/B Molecular    Nausea       Health Maintenance Topics with due status: Not Due       Topic Last Completion Date    Influenza Vaccine 04/28/2025    RSV Vaccine (Age 60+ and Pregnant patients) Not Due         Future Appointments   Date Time Provider Department Center   11/13/2025  8:45 AM Jose Singh MD [facility]   12/23/2025  2:00 PM [facility]        Follow up if symptoms worsen or fail to improve.     Signature:  Joana Arboleda DNP, FNP  [address] [place] [address]  Phone #: [phone]  Fax #: [phone]    Date of encounter: 8/7/25    Patient Instructions   [place] diet. Otc sinus meds. Follow up if symptoms persist.

## 2025-08-20 ENCOUNTER — OFFICE VISIT (OUTPATIENT)
Dept: FAMILY MEDICINE | Facility: CLINIC | Age: 40
End: 2025-08-20

## 2025-08-20 VITALS
HEIGHT: 68 IN | HEART RATE: 77 BPM | TEMPERATURE: 99 F | SYSTOLIC BLOOD PRESSURE: 115 MMHG | WEIGHT: 215 LBS | DIASTOLIC BLOOD PRESSURE: 81 MMHG | OXYGEN SATURATION: 98 % | BODY MASS INDEX: 32.58 KG/M2 | RESPIRATION RATE: 18 BRPM

## 2025-08-20 DIAGNOSIS — K21.9 GASTROESOPHAGEAL REFLUX DISEASE, UNSPECIFIED WHETHER ESOPHAGITIS PRESENT: Primary | ICD-10-CM

## 2025-08-20 DIAGNOSIS — Z32.00 POSSIBLE PREGNANCY, NOT YET CONFIRMED: ICD-10-CM

## 2025-08-20 LAB
B-HCG UR QL: NEGATIVE
CTP QC/QA: YES

## 2025-08-20 PROCEDURE — 81025 URINE PREGNANCY TEST: CPT | Mod: QW,,,

## 2025-08-20 PROCEDURE — 99213 OFFICE O/P EST LOW 20 MIN: CPT | Mod: ,,,

## 2025-08-20 RX ORDER — FAMOTIDINE 20 MG/1
20 TABLET, FILM COATED ORAL 2 TIMES DAILY
Qty: 60 TABLET | Refills: 11 | Status: SHIPPED | OUTPATIENT
Start: 2025-08-20 | End: 2026-08-20

## 2025-08-20 RX ORDER — OMEPRAZOLE 40 MG/1
40 CAPSULE, DELAYED RELEASE ORAL DAILY
Qty: 90 CAPSULE | Refills: 3 | Status: SHIPPED | OUTPATIENT
Start: 2025-08-20 | End: 2026-08-20

## 2025-08-28 ENCOUNTER — OFFICE VISIT (OUTPATIENT)
Dept: FAMILY MEDICINE | Facility: CLINIC | Age: 40
End: 2025-08-28
Payer: COMMERCIAL

## 2025-08-28 VITALS
WEIGHT: 214 LBS | OXYGEN SATURATION: 97 % | TEMPERATURE: 98 F | DIASTOLIC BLOOD PRESSURE: 88 MMHG | RESPIRATION RATE: 18 BRPM | HEART RATE: 105 BPM | SYSTOLIC BLOOD PRESSURE: 128 MMHG | BODY MASS INDEX: 32.43 KG/M2 | HEIGHT: 68 IN

## 2025-08-28 DIAGNOSIS — U07.1 COVID-19: Primary | ICD-10-CM

## 2025-08-28 DIAGNOSIS — J34.89 SINUS PRESSURE: ICD-10-CM

## 2025-08-28 DIAGNOSIS — R05.9 COUGH, UNSPECIFIED TYPE: ICD-10-CM

## 2025-08-28 LAB
CTP QC/QA: YES
CTP QC/QA: YES
POC MOLECULAR INFLUENZA A AGN: NEGATIVE
POC MOLECULAR INFLUENZA B AGN: NEGATIVE
SARS-COV-2 RDRP RESP QL NAA+PROBE: POSITIVE

## 2025-08-28 PROCEDURE — 99213 OFFICE O/P EST LOW 20 MIN: CPT | Mod: ,,,

## 2025-08-28 PROCEDURE — 3074F SYST BP LT 130 MM HG: CPT | Mod: ,,,

## 2025-08-28 PROCEDURE — 1160F RVW MEDS BY RX/DR IN RCRD: CPT | Mod: ,,,

## 2025-08-28 PROCEDURE — 87635 SARS-COV-2 COVID-19 AMP PRB: CPT | Mod: QW,,,

## 2025-08-28 PROCEDURE — 3008F BODY MASS INDEX DOCD: CPT | Mod: ,,,

## 2025-08-28 PROCEDURE — 87502 INFLUENZA DNA AMP PROBE: CPT | Mod: QW,,,

## 2025-08-28 PROCEDURE — 3079F DIAST BP 80-89 MM HG: CPT | Mod: ,,,

## 2025-08-28 PROCEDURE — 1159F MED LIST DOCD IN RCRD: CPT | Mod: ,,,

## 2025-08-28 RX ORDER — ATENOLOL 100 MG/1
100 TABLET ORAL
COMMUNITY
Start: 2025-08-19

## 2025-08-28 RX ORDER — BENZONATATE 200 MG/1
200 CAPSULE ORAL 3 TIMES DAILY PRN
Qty: 30 CAPSULE | Refills: 0 | Status: SHIPPED | OUTPATIENT
Start: 2025-08-28 | End: 2025-09-07

## 2025-08-28 RX ORDER — FLUTICASONE PROPIONATE 50 MCG
2 SPRAY, SUSPENSION (ML) NASAL DAILY
Qty: 11.1 ML | Refills: 0 | Status: SHIPPED | OUTPATIENT
Start: 2025-08-28

## 2025-09-02 ENCOUNTER — TELEPHONE (OUTPATIENT)
Dept: FAMILY MEDICINE | Facility: CLINIC | Age: 40
End: 2025-09-02
Payer: COMMERCIAL

## 2025-09-03 ENCOUNTER — TELEPHONE (OUTPATIENT)
Dept: FAMILY MEDICINE | Facility: CLINIC | Age: 40
End: 2025-09-03
Payer: COMMERCIAL

## 2025-09-03 DIAGNOSIS — J06.9 UPPER RESPIRATORY TRACT INFECTION, UNSPECIFIED TYPE: Primary | ICD-10-CM

## 2025-09-04 RX ORDER — DEXTROMETHORPHAN HBR, PHENYLEPHRINE HCL, PYRILAMINE MALEATE 7.5; 5; 12.5 MG/5ML; MG/5ML; MG/5ML
5 SYRUP ORAL 3 TIMES DAILY PRN
Qty: 120 ML | Refills: 0 | Status: SHIPPED | OUTPATIENT
Start: 2025-09-04

## (undated) DEVICE — SOL NACL IRR 3000ML

## (undated) DEVICE — KIT GYN LAPAROSCOPY RUSH

## (undated) DEVICE — SUT 0 VICRYL / UR6 (J603)

## (undated) DEVICE — TROCAR ENDO Z THREAD KII 5X100

## (undated) DEVICE — NDL ACCESS 14GA

## (undated) DEVICE — GOWN POLY REINF BRTH SLV XL

## (undated) DEVICE — APPLICATOR CHLORAPREP ORN 26ML

## (undated) DEVICE — UTERINE MANIPULATOR HUMI 6003

## (undated) DEVICE — SOL NACL IRR 1000ML BTL

## (undated) DEVICE — GLOVE PROTEXIS PI SYN SURG 7.5

## (undated) DEVICE — GLOVE PROTEXIS PI SYN SURG 8.0

## (undated) DEVICE — SET CYSTO IRR DRP CHMBR 84IN

## (undated) DEVICE — DISSECTOR KITTNER 5MM T 45CM S

## (undated) DEVICE — WARMER BLUE HEAT SCOPE 3-12MM

## (undated) DEVICE — SUT MONOCRYL 4-0 PS-2

## (undated) DEVICE — CANNULA LAP SEAL Z THRD 5X100

## (undated) DEVICE — TRAY CATH FOL SIL URIMTR 16FR

## (undated) DEVICE — ADHESIVE DERMABOND ADVANCED

## (undated) DEVICE — TRAY VAG PREP